# Patient Record
Sex: FEMALE | Race: WHITE | HISPANIC OR LATINO | Employment: FULL TIME | URBAN - METROPOLITAN AREA
[De-identification: names, ages, dates, MRNs, and addresses within clinical notes are randomized per-mention and may not be internally consistent; named-entity substitution may affect disease eponyms.]

---

## 2020-07-08 ENCOUNTER — APPOINTMENT (EMERGENCY)
Dept: CT IMAGING | Facility: HOSPITAL | Age: 35
End: 2020-07-08
Payer: COMMERCIAL

## 2020-07-08 ENCOUNTER — HOSPITAL ENCOUNTER (EMERGENCY)
Facility: HOSPITAL | Age: 35
Discharge: HOME | End: 2020-07-08
Attending: EMERGENCY MEDICINE | Admitting: EMERGENCY MEDICINE
Payer: COMMERCIAL

## 2020-07-08 VITALS
DIASTOLIC BLOOD PRESSURE: 69 MMHG | SYSTOLIC BLOOD PRESSURE: 118 MMHG | OXYGEN SATURATION: 100 % | TEMPERATURE: 97.9 F | HEART RATE: 70 BPM | WEIGHT: 200.4 LBS | RESPIRATION RATE: 18 BRPM

## 2020-07-08 DIAGNOSIS — G43.909 MIGRAINE WITHOUT STATUS MIGRAINOSUS, NOT INTRACTABLE, UNSPECIFIED MIGRAINE TYPE: Primary | ICD-10-CM

## 2020-07-08 LAB
EXT PREG TEST URINE: NEGATIVE
EXT. CONTROL ED NAV: NORMAL

## 2020-07-08 PROCEDURE — 99284 EMERGENCY DEPT VISIT MOD MDM: CPT

## 2020-07-08 PROCEDURE — 96374 THER/PROPH/DIAG INJ IV PUSH: CPT

## 2020-07-08 PROCEDURE — 96361 HYDRATE IV INFUSION ADD-ON: CPT

## 2020-07-08 PROCEDURE — 96375 TX/PRO/DX INJ NEW DRUG ADDON: CPT

## 2020-07-08 PROCEDURE — 99284 EMERGENCY DEPT VISIT MOD MDM: CPT | Performed by: PHYSICIAN ASSISTANT

## 2020-07-08 PROCEDURE — 70450 CT HEAD/BRAIN W/O DYE: CPT

## 2020-07-08 PROCEDURE — 81025 URINE PREGNANCY TEST: CPT | Performed by: PHYSICIAN ASSISTANT

## 2020-07-08 RX ORDER — KETOROLAC TROMETHAMINE 30 MG/ML
30 INJECTION, SOLUTION INTRAMUSCULAR; INTRAVENOUS ONCE
Status: COMPLETED | OUTPATIENT
Start: 2020-07-08 | End: 2020-07-08

## 2020-07-08 RX ORDER — IBUPROFEN 600 MG/1
600 TABLET ORAL EVERY 6 HOURS PRN
Qty: 30 TABLET | Refills: 0 | Status: SHIPPED | OUTPATIENT
Start: 2020-07-08 | End: 2020-12-02

## 2020-07-08 RX ORDER — DIPHENHYDRAMINE HYDROCHLORIDE 50 MG/ML
25 INJECTION INTRAMUSCULAR; INTRAVENOUS ONCE
Status: COMPLETED | OUTPATIENT
Start: 2020-07-08 | End: 2020-07-08

## 2020-07-08 RX ORDER — SODIUM CHLORIDE 9 MG/ML
250 INJECTION, SOLUTION INTRAVENOUS CONTINUOUS
Status: DISCONTINUED | OUTPATIENT
Start: 2020-07-08 | End: 2020-07-08 | Stop reason: HOSPADM

## 2020-07-08 RX ORDER — ONDANSETRON 4 MG/1
4 TABLET, FILM COATED ORAL EVERY 12 HOURS PRN
Qty: 12 TABLET | Refills: 0 | Status: SHIPPED | OUTPATIENT
Start: 2020-07-08 | End: 2020-12-09

## 2020-07-08 RX ORDER — METOCLOPRAMIDE HYDROCHLORIDE 5 MG/ML
10 INJECTION INTRAMUSCULAR; INTRAVENOUS ONCE
Status: COMPLETED | OUTPATIENT
Start: 2020-07-08 | End: 2020-07-08

## 2020-07-08 RX ADMIN — METOCLOPRAMIDE 10 MG: 5 INJECTION, SOLUTION INTRAMUSCULAR; INTRAVENOUS at 10:39

## 2020-07-08 RX ADMIN — KETOROLAC TROMETHAMINE 30 MG: 30 INJECTION, SOLUTION INTRAMUSCULAR; INTRAVENOUS at 12:48

## 2020-07-08 RX ADMIN — SODIUM CHLORIDE 250 ML/HR: 0.9 INJECTION, SOLUTION INTRAVENOUS at 10:43

## 2020-07-08 RX ADMIN — DIPHENHYDRAMINE HYDROCHLORIDE 25 MG: 50 INJECTION INTRAMUSCULAR; INTRAVENOUS at 10:39

## 2020-07-08 NOTE — ED PROVIDER NOTES
History  Chief Complaint   Patient presents with    Migraine     pt c/o a migraine x2 weeks with no relief from prescribed medications  Topomax taken last night       Medical Problem   Location:  Pt with left sided h eadache x 2 weeks  like past headaches  pt with nausea photophobia like past headaches   Severity:  Moderate  Onset quality:  Gradual  Duration:  2 weeks  Timing:  Constant  Progression:  Unchanged  Chronicity:  Recurrent  Associated symptoms: headaches and nausea    Associated symptoms: no abdominal pain, no chest pain, no congestion, no cough, no diarrhea, no ear pain, no fatigue, no fever, no loss of consciousness, no myalgias, no rash, no rhinorrhea, no shortness of breath, no sore throat, no vomiting and no wheezing        None       Past Medical History:   Diagnosis Date    Asthma     Hypertension     Migraine        Past Surgical History:   Procedure Laterality Date    TUBAL LIGATION         History reviewed  No pertinent family history  I have reviewed and agree with the history as documented  E-Cigarette/Vaping     E-Cigarette/Vaping Substances     Social History     Tobacco Use    Smoking status: Current Every Day Smoker     Packs/day: 0 50     Types: Cigarettes    Smokeless tobacco: Never Used   Substance Use Topics    Alcohol use: Not Currently    Drug use: Never       Review of Systems   Constitutional: Negative  Negative for fatigue and fever  HENT: Negative  Negative for congestion, ear pain, rhinorrhea and sore throat  Eyes: Negative  Respiratory: Negative  Negative for cough, shortness of breath and wheezing  Cardiovascular: Negative  Negative for chest pain  Gastrointestinal: Positive for nausea  Negative for abdominal pain, diarrhea and vomiting  Endocrine: Negative  Genitourinary: Negative  Musculoskeletal: Negative  Negative for myalgias  Skin: Negative for rash  Allergic/Immunologic: Negative  Neurological: Positive for headaches  Negative for loss of consciousness  Hematological: Negative  Psychiatric/Behavioral: Negative  All other systems reviewed and are negative  Physical Exam  Physical Exam   Constitutional: She is oriented to person, place, and time  She appears well-developed and well-nourished  1245 pt with much improved pain  Declines spinal tap will send pt home    HENT:   Head: Normocephalic and atraumatic  Right Ear: External ear normal    Left Ear: External ear normal    Nose: Nose normal    Mouth/Throat: Oropharynx is clear and moist    Eyes: Pupils are equal, round, and reactive to light  Conjunctivae and EOM are normal    Neck: Normal range of motion  Neck supple  Cardiovascular: Normal rate, regular rhythm, normal heart sounds and intact distal pulses  Pulmonary/Chest: Effort normal and breath sounds normal    Abdominal: Soft  Bowel sounds are normal    Musculoskeletal: Normal range of motion  Neurological: She is alert and oriented to person, place, and time  Skin: Skin is warm  Capillary refill takes less than 2 seconds  Psychiatric: She has a normal mood and affect  Her behavior is normal    Nursing note and vitals reviewed        Vital Signs  ED Triage Vitals   Temperature Pulse Respirations Blood Pressure SpO2   07/08/20 1020 07/08/20 1020 07/08/20 1020 07/08/20 1020 07/08/20 1020   97 9 °F (36 6 °C) (!) 106 18 129/81 99 %      Temp Source Heart Rate Source Patient Position - Orthostatic VS BP Location FiO2 (%)   07/08/20 1020 07/08/20 1020 07/08/20 1020 07/08/20 1020 --   Tympanic Monitor Sitting Left arm       Pain Score       07/08/20 1045       Worst Possible Pain           Vitals:    07/08/20 1020 07/08/20 1249   BP: 129/81 118/69   Pulse: (!) 106 70   Patient Position - Orthostatic VS: Sitting Sitting         Visual Acuity      ED Medications  Medications   metoclopramide (REGLAN) injection 10 mg (10 mg Intravenous Given 7/8/20 1039)   diphenhydrAMINE (BENADRYL) injection 25 mg (25 mg Intravenous Given 7/8/20 1039)   ketorolac (TORADOL) injection 30 mg (30 mg Intravenous Given 7/8/20 1248)       Diagnostic Studies  Results Reviewed     Procedure Component Value Units Date/Time    POCT pregnancy, urine [[id number]]  (Normal) Resulted:  07/08/20 1039    Lab Status:  Final result Updated:  07/08/20 1039     EXT PREG TEST UR (Ref: Negative) negative     Control valid                 CT head without contrast   Final Result by Becky Amanda MD (07/08 1228)      No acute intracranial abnormality  Workstation performed: [id number]                    Procedures  Procedures         ED Course       US AUDIT      Most Recent Value   Initial Alcohol Screen: US AUDIT-C    1  How often do you have a drink containing alcohol?  0 Filed at: 07/08/2020 1023   2  How many drinks containing alcohol do you have on a typical day you are drinking? 0 Filed at: 07/08/2020 1023   3a  Male UNDER 65: How often do you have five or more drinks on one occasion? 0 Filed at: 07/08/2020 1023   3b  FEMALE Any Age, or MALE 65+: How often do you have 4 or more drinks on one occassion? 0 Filed at: 07/08/2020 1023   Audit-C Score  0 Filed at: 07/08/2020 1023                  NALDO/DAST-10      Most Recent Value   How many times in the past year have you    Used an illegal drug or used a prescription medication for non-medical reasons? Never Filed at: 07/08/2020 1022                                MDM      Disposition  Final diagnoses:   Migraine without status migrainosus, not intractable, unspecified migraine type     Time reflects when diagnosis was documented in both MDM as applicable and the Disposition within this note     Time User Action Codes Description Comment    7/8/2020 12:45 PM Alex Hall   Add [G43 909] Migraine without status migrainosus, not intractable, unspecified migraine type       ED Disposition     ED Disposition Condition Date/Time Comment    Discharge Stable Wed Jul 8, 2020 12:45 PM Vern Lind discharge to home/self care  Follow-up Information     Follow up With Specialties Details Why [address] [place] [address]  [phone]            Discharge Medication List as of 7/8/2020 12:47 PM      START taking these medications    Details   ibuprofen (MOTRIN) 600 mg tablet Take 1 tablet (600 mg total) by mouth every 6 (six) hours as needed for mild pain, Starting Wed 7/8/2020, Print      ondansetron (ZOFRAN) 4 mg tablet Take 1 tablet (4 mg total) by mouth every 12 (twelve) hours as needed for nausea, Starting Wed 7/8/2020, Print           No discharge procedures on file      PDMP Review     None          ED Provider  Electronically Signed by           Hansa Zayas PA-C  07/08/20 7632

## 2020-12-02 ENCOUNTER — HOSPITAL ENCOUNTER (EMERGENCY)
Facility: HOSPITAL | Age: 35
Discharge: HOME | End: 2020-12-03
Attending: EMERGENCY MEDICINE | Admitting: EMERGENCY MEDICINE
Payer: COMMERCIAL

## 2020-12-02 ENCOUNTER — APPOINTMENT (EMERGENCY)
Dept: RADIOLOGY | Facility: HOSPITAL | Age: 35
End: 2020-12-02
Payer: COMMERCIAL

## 2020-12-02 DIAGNOSIS — S52.502A DISTAL RADIUS FRACTURE, LEFT: Primary | ICD-10-CM

## 2020-12-02 PROCEDURE — 73110 X-RAY EXAM OF WRIST: CPT

## 2020-12-02 PROCEDURE — 29125 APPL SHORT ARM SPLINT STATIC: CPT | Performed by: PHYSICIAN ASSISTANT

## 2020-12-02 PROCEDURE — 96374 THER/PROPH/DIAG INJ IV PUSH: CPT

## 2020-12-02 PROCEDURE — 99285 EMERGENCY DEPT VISIT HI MDM: CPT | Performed by: PHYSICIAN ASSISTANT

## 2020-12-02 PROCEDURE — 73090 X-RAY EXAM OF FOREARM: CPT

## 2020-12-02 PROCEDURE — 99284 EMERGENCY DEPT VISIT MOD MDM: CPT

## 2020-12-02 RX ORDER — ESCITALOPRAM OXALATE 10 MG/1
10 TABLET ORAL AS NEEDED
COMMUNITY
Start: 2020-07-28

## 2020-12-02 RX ORDER — FENTANYL CITRATE 50 UG/ML
75 INJECTION, SOLUTION INTRAMUSCULAR; INTRAVENOUS ONCE
Status: COMPLETED | OUTPATIENT
Start: 2020-12-02 | End: 2020-12-02

## 2020-12-02 RX ORDER — TOPIRAMATE 50 MG/1
50 TABLET, FILM COATED ORAL AS NEEDED
COMMUNITY
Start: 2020-06-16 | End: 2021-06-16

## 2020-12-02 RX ORDER — ALBUTEROL SULFATE 90 UG/1
2 AEROSOL, METERED RESPIRATORY (INHALATION) EVERY 6 HOURS PRN
COMMUNITY
Start: 2020-04-01 | End: 2021-04-01

## 2020-12-02 RX ORDER — SUMATRIPTAN 50 MG/1
50 TABLET, FILM COATED ORAL 2 TIMES DAILY PRN
COMMUNITY
Start: 2020-07-28 | End: 2021-07-28

## 2020-12-02 RX ORDER — OXYCODONE HYDROCHLORIDE AND ACETAMINOPHEN 5; 325 MG/1; MG/1
1 TABLET ORAL ONCE
Status: DISCONTINUED | OUTPATIENT
Start: 2020-12-02 | End: 2020-12-02

## 2020-12-02 RX ORDER — IBUPROFEN 400 MG/1
400 TABLET ORAL ONCE
Status: COMPLETED | OUTPATIENT
Start: 2020-12-02 | End: 2020-12-02

## 2020-12-02 RX ADMIN — FENTANYL CITRATE 75 MCG: 50 INJECTION, SOLUTION INTRAMUSCULAR; INTRAVENOUS at 23:45

## 2020-12-02 RX ADMIN — IBUPROFEN 400 MG: 400 TABLET ORAL at 21:27

## 2020-12-03 ENCOUNTER — TELEPHONE (OUTPATIENT)
Dept: OBGYN CLINIC | Facility: HOSPITAL | Age: 35
End: 2020-12-03

## 2020-12-03 ENCOUNTER — APPOINTMENT (EMERGENCY)
Dept: RADIOLOGY | Facility: HOSPITAL | Age: 35
End: 2020-12-03
Payer: COMMERCIAL

## 2020-12-03 VITALS
HEIGHT: 68 IN | TEMPERATURE: 98.1 F | HEART RATE: 80 BPM | DIASTOLIC BLOOD PRESSURE: 70 MMHG | BODY MASS INDEX: 28.79 KG/M2 | OXYGEN SATURATION: 99 % | WEIGHT: 190 LBS | RESPIRATION RATE: 16 BRPM | SYSTOLIC BLOOD PRESSURE: 121 MMHG

## 2020-12-03 PROCEDURE — 73110 X-RAY EXAM OF WRIST: CPT

## 2020-12-03 PROCEDURE — 96376 TX/PRO/DX INJ SAME DRUG ADON: CPT

## 2020-12-03 RX ORDER — FENTANYL CITRATE 50 UG/ML
INJECTION, SOLUTION INTRAMUSCULAR; INTRAVENOUS
Status: DISPENSED
Start: 2020-12-03 | End: 2020-12-03

## 2020-12-03 RX ORDER — FENTANYL CITRATE 50 UG/ML
50 INJECTION, SOLUTION INTRAMUSCULAR; INTRAVENOUS ONCE
Status: COMPLETED | OUTPATIENT
Start: 2020-12-03 | End: 2020-12-03

## 2020-12-03 RX ADMIN — FENTANYL CITRATE 50 MCG: 50 INJECTION, SOLUTION INTRAMUSCULAR; INTRAVENOUS at 01:15

## 2020-12-08 ENCOUNTER — OFFICE VISIT (OUTPATIENT)
Dept: OBGYN CLINIC | Facility: MEDICAL CENTER | Age: 35
End: 2020-12-08
Payer: COMMERCIAL

## 2020-12-08 VITALS
TEMPERATURE: 96.7 F | HEART RATE: 108 BPM | WEIGHT: 190 LBS | BODY MASS INDEX: 28.79 KG/M2 | HEIGHT: 68 IN | SYSTOLIC BLOOD PRESSURE: 126 MMHG | DIASTOLIC BLOOD PRESSURE: 79 MMHG

## 2020-12-08 DIAGNOSIS — S52.602A CLOSED FRACTURE DISTAL RADIUS AND ULNA, LEFT, INITIAL ENCOUNTER: Primary | ICD-10-CM

## 2020-12-08 DIAGNOSIS — S52.502A CLOSED FRACTURE DISTAL RADIUS AND ULNA, LEFT, INITIAL ENCOUNTER: Primary | ICD-10-CM

## 2020-12-08 PROCEDURE — 29125 APPL SHORT ARM SPLINT STATIC: CPT | Performed by: ORTHOPAEDIC SURGERY

## 2020-12-08 PROCEDURE — 99204 OFFICE O/P NEW MOD 45 MIN: CPT | Performed by: ORTHOPAEDIC SURGERY

## 2020-12-08 RX ORDER — CEFAZOLIN SODIUM 2 G/50ML
2000 SOLUTION INTRAVENOUS ONCE
Status: CANCELLED | OUTPATIENT
Start: 2020-12-11 | End: 2020-12-08

## 2020-12-10 ENCOUNTER — TELEPHONE (OUTPATIENT)
Dept: OBGYN CLINIC | Facility: MEDICAL CENTER | Age: 35
End: 2020-12-10

## 2020-12-10 DIAGNOSIS — S52.502A CLOSED FRACTURE DISTAL RADIUS AND ULNA, LEFT, INITIAL ENCOUNTER: Primary | ICD-10-CM

## 2020-12-10 DIAGNOSIS — S52.602A CLOSED FRACTURE DISTAL RADIUS AND ULNA, LEFT, INITIAL ENCOUNTER: Primary | ICD-10-CM

## 2020-12-11 RX ORDER — IBUPROFEN 600 MG/1
600 TABLET ORAL EVERY 6 HOURS PRN
Qty: 30 TABLET | Refills: 0 | Status: SHIPPED | OUTPATIENT
Start: 2020-12-11 | End: 2020-12-15 | Stop reason: HOSPADM

## 2020-12-11 RX ORDER — OXYCODONE HYDROCHLORIDE 5 MG/1
5 TABLET ORAL EVERY 6 HOURS PRN
Qty: 10 TABLET | Refills: 0 | Status: ON HOLD | OUTPATIENT
Start: 2020-12-11 | End: 2020-12-15 | Stop reason: SDUPTHER

## 2020-12-14 ENCOUNTER — ANESTHESIA EVENT (OUTPATIENT)
Dept: PERIOP | Facility: HOSPITAL | Age: 35
End: 2020-12-14
Payer: COMMERCIAL

## 2020-12-15 ENCOUNTER — HOSPITAL ENCOUNTER (OUTPATIENT)
Facility: HOSPITAL | Age: 35
Setting detail: OUTPATIENT SURGERY
Discharge: HOME | End: 2020-12-15
Attending: ORTHOPAEDIC SURGERY | Admitting: ORTHOPAEDIC SURGERY
Payer: COMMERCIAL

## 2020-12-15 ENCOUNTER — HOSPITAL ENCOUNTER (OUTPATIENT)
Dept: RADIOLOGY | Facility: HOSPITAL | Age: 35
Setting detail: OUTPATIENT SURGERY
Discharge: HOME | End: 2020-12-15
Payer: COMMERCIAL

## 2020-12-15 ENCOUNTER — ANESTHESIA (OUTPATIENT)
Dept: PERIOP | Facility: HOSPITAL | Age: 35
End: 2020-12-15
Payer: COMMERCIAL

## 2020-12-15 VITALS
RESPIRATION RATE: 16 BRPM | DIASTOLIC BLOOD PRESSURE: 75 MMHG | TEMPERATURE: 96.7 F | SYSTOLIC BLOOD PRESSURE: 121 MMHG | HEIGHT: 68 IN | HEART RATE: 97 BPM | WEIGHT: 190 LBS | OXYGEN SATURATION: 98 % | BODY MASS INDEX: 28.79 KG/M2

## 2020-12-15 VITALS — HEART RATE: 94 BPM

## 2020-12-15 DIAGNOSIS — S52.602A CLOSED FRACTURE DISTAL RADIUS AND ULNA, LEFT, INITIAL ENCOUNTER: ICD-10-CM

## 2020-12-15 DIAGNOSIS — S52.502A CLOSED FRACTURE DISTAL RADIUS AND ULNA, LEFT, INITIAL ENCOUNTER: ICD-10-CM

## 2020-12-15 PROBLEM — R51.9 HEADACHE: Status: ACTIVE | Noted: 2020-12-15

## 2020-12-15 PROBLEM — F41.9 ANXIETY: Status: ACTIVE | Noted: 2020-12-15

## 2020-12-15 PROBLEM — J45.909 ASTHMA: Status: ACTIVE | Noted: 2020-12-15

## 2020-12-15 LAB
EXT PREGNANCY TEST URINE: NEGATIVE
EXT. CONTROL: NORMAL

## 2020-12-15 PROCEDURE — C1713 ANCHOR/SCREW BN/BN,TIS/BN: HCPCS | Performed by: ORTHOPAEDIC SURGERY

## 2020-12-15 PROCEDURE — 81025 URINE PREGNANCY TEST: CPT | Performed by: STUDENT IN AN ORGANIZED HEALTH CARE EDUCATION/TRAINING PROGRAM

## 2020-12-15 PROCEDURE — 73100 X-RAY EXAM OF WRIST: CPT

## 2020-12-15 PROCEDURE — 25609 OPTX DST RD XART FX/EP SEP3+: CPT | Performed by: ORTHOPAEDIC SURGERY

## 2020-12-15 DEVICE — SCREW CORTICAL 3.2 X 12MM
Type: IMPLANTABLE DEVICE | Site: WRIST | Status: NON-FUNCTIONAL
Removed: 2021-03-23

## 2020-12-15 DEVICE — K-WIRE FIXATION 1.1 X 100MM SS: Type: IMPLANTABLE DEVICE | Site: WRIST | Status: FUNCTIONAL

## 2020-12-15 DEVICE — SCREW CORT 2.7 X 10MM
Type: IMPLANTABLE DEVICE | Site: WRIST | Status: NON-FUNCTIONAL
Removed: 2021-03-23

## 2020-12-15 DEVICE — SCREW CORT LCK 2.7 X 14MM
Type: IMPLANTABLE DEVICE | Site: WRIST | Status: NON-FUNCTIONAL
Removed: 2021-03-23

## 2020-12-15 DEVICE — SCREW CORT LCK 2.7 X 10MM
Type: IMPLANTABLE DEVICE | Site: WRIST | Status: NON-FUNCTIONAL
Removed: 2021-03-23

## 2020-12-15 DEVICE — SCREW CORTICAL 3.2 X 14MM
Type: IMPLANTABLE DEVICE | Site: WRIST | Status: NON-FUNCTIONAL
Removed: 2021-03-23

## 2020-12-15 DEVICE — PLATE BRIDGE
Type: IMPLANTABLE DEVICE | Site: WRIST | Status: NON-FUNCTIONAL
Removed: 2021-03-23

## 2020-12-15 RX ORDER — CEFAZOLIN SODIUM 2 G/50ML
SOLUTION INTRAVENOUS AS NEEDED
Status: DISCONTINUED | OUTPATIENT
Start: 2020-12-15 | End: 2020-12-15

## 2020-12-15 RX ORDER — HYDROMORPHONE HCL/PF 1 MG/ML
0.2 SYRINGE (ML) INJECTION
Status: DISCONTINUED | OUTPATIENT
Start: 2020-12-15 | End: 2020-12-15 | Stop reason: HOSPADM

## 2020-12-15 RX ORDER — METOCLOPRAMIDE HYDROCHLORIDE 5 MG/ML
10 INJECTION INTRAMUSCULAR; INTRAVENOUS ONCE AS NEEDED
Status: DISCONTINUED | OUTPATIENT
Start: 2020-12-15 | End: 2020-12-15 | Stop reason: HOSPADM

## 2020-12-15 RX ORDER — MIDAZOLAM HYDROCHLORIDE 2 MG/2ML
INJECTION, SOLUTION INTRAMUSCULAR; INTRAVENOUS AS NEEDED
Status: DISCONTINUED | OUTPATIENT
Start: 2020-12-15 | End: 2020-12-15

## 2020-12-15 RX ORDER — DIPHENHYDRAMINE HYDROCHLORIDE 50 MG/ML
12.5 INJECTION INTRAMUSCULAR; INTRAVENOUS ONCE AS NEEDED
Status: DISCONTINUED | OUTPATIENT
Start: 2020-12-15 | End: 2020-12-15 | Stop reason: HOSPADM

## 2020-12-15 RX ORDER — FENTANYL CITRATE/PF 50 MCG/ML
50 SYRINGE (ML) INJECTION
Status: DISCONTINUED | OUTPATIENT
Start: 2020-12-15 | End: 2020-12-15 | Stop reason: HOSPADM

## 2020-12-15 RX ORDER — HYDROMORPHONE HCL/PF 1 MG/ML
0.5 SYRINGE (ML) INJECTION
Status: DISCONTINUED | OUTPATIENT
Start: 2020-12-15 | End: 2020-12-15 | Stop reason: HOSPADM

## 2020-12-15 RX ORDER — OXYCODONE HYDROCHLORIDE 5 MG/1
5 TABLET ORAL EVERY 6 HOURS PRN
Qty: 20 TABLET | Refills: 0 | Status: SHIPPED | OUTPATIENT
Start: 2020-12-15 | End: 2020-12-25

## 2020-12-15 RX ORDER — EPHEDRINE SULFATE 50 MG/ML
INJECTION INTRAVENOUS AS NEEDED
Status: DISCONTINUED | OUTPATIENT
Start: 2020-12-15 | End: 2020-12-15

## 2020-12-15 RX ORDER — LIDOCAINE HYDROCHLORIDE 10 MG/ML
0.5 INJECTION, SOLUTION EPIDURAL; INFILTRATION; INTRACAUDAL; PERINEURAL ONCE AS NEEDED
Status: DISCONTINUED | OUTPATIENT
Start: 2020-12-15 | End: 2020-12-15 | Stop reason: SDUPTHER

## 2020-12-15 RX ORDER — NAPROXEN SODIUM 220 MG
220 TABLET ORAL 2 TIMES DAILY WITH MEALS
Qty: 10 TABLET | Refills: 0 | Status: SHIPPED | OUTPATIENT
Start: 2020-12-15 | End: 2021-03-18

## 2020-12-15 RX ORDER — SODIUM CHLORIDE, SODIUM LACTATE, POTASSIUM CHLORIDE, CALCIUM CHLORIDE 600; 310; 30; 20 MG/100ML; MG/100ML; MG/100ML; MG/100ML
125 INJECTION, SOLUTION INTRAVENOUS CONTINUOUS
Status: DISCONTINUED | OUTPATIENT
Start: 2020-12-15 | End: 2020-12-15 | Stop reason: HOSPADM

## 2020-12-15 RX ORDER — SENNOSIDES 8.6 MG
650 CAPSULE ORAL EVERY 8 HOURS
Qty: 15 TABLET | Refills: 0 | Status: SHIPPED | OUTPATIENT
Start: 2020-12-15 | End: 2020-12-20

## 2020-12-15 RX ORDER — METOCLOPRAMIDE HYDROCHLORIDE 5 MG/ML
INJECTION INTRAMUSCULAR; INTRAVENOUS AS NEEDED
Status: DISCONTINUED | OUTPATIENT
Start: 2020-12-15 | End: 2020-12-15

## 2020-12-15 RX ORDER — DEXAMETHASONE SODIUM PHOSPHATE 10 MG/ML
INJECTION, SOLUTION INTRAMUSCULAR; INTRAVENOUS AS NEEDED
Status: DISCONTINUED | OUTPATIENT
Start: 2020-12-15 | End: 2020-12-15

## 2020-12-15 RX ORDER — CEFAZOLIN SODIUM 2 G/50ML
2000 SOLUTION INTRAVENOUS ONCE
Status: DISCONTINUED | OUTPATIENT
Start: 2020-12-15 | End: 2020-12-15 | Stop reason: HOSPADM

## 2020-12-15 RX ORDER — PROPOFOL 10 MG/ML
INJECTION, EMULSION INTRAVENOUS AS NEEDED
Status: DISCONTINUED | OUTPATIENT
Start: 2020-12-15 | End: 2020-12-15

## 2020-12-15 RX ORDER — ROPIVACAINE HYDROCHLORIDE 5 MG/ML
INJECTION, SOLUTION EPIDURAL; INFILTRATION; PERINEURAL
Status: COMPLETED | OUTPATIENT
Start: 2020-12-15 | End: 2020-12-15

## 2020-12-15 RX ORDER — ONDANSETRON 2 MG/ML
4 INJECTION INTRAMUSCULAR; INTRAVENOUS ONCE AS NEEDED
Status: DISCONTINUED | OUTPATIENT
Start: 2020-12-15 | End: 2020-12-15 | Stop reason: HOSPADM

## 2020-12-15 RX ORDER — LIDOCAINE HYDROCHLORIDE 10 MG/ML
0.5 INJECTION, SOLUTION EPIDURAL; INFILTRATION; INTRACAUDAL; PERINEURAL ONCE AS NEEDED
Status: COMPLETED | OUTPATIENT
Start: 2020-12-15 | End: 2020-12-15

## 2020-12-15 RX ORDER — OXYCODONE HYDROCHLORIDE 5 MG/1
5 TABLET ORAL EVERY 6 HOURS PRN
Status: DISCONTINUED | OUTPATIENT
Start: 2020-12-15 | End: 2020-12-15 | Stop reason: HOSPADM

## 2020-12-15 RX ORDER — LIDOCAINE HYDROCHLORIDE 10 MG/ML
INJECTION, SOLUTION EPIDURAL; INFILTRATION; INTRACAUDAL; PERINEURAL AS NEEDED
Status: DISCONTINUED | OUTPATIENT
Start: 2020-12-15 | End: 2020-12-15

## 2020-12-15 RX ORDER — SODIUM CHLORIDE, SODIUM LACTATE, POTASSIUM CHLORIDE, CALCIUM CHLORIDE 600; 310; 30; 20 MG/100ML; MG/100ML; MG/100ML; MG/100ML
50 INJECTION, SOLUTION INTRAVENOUS CONTINUOUS
Status: DISCONTINUED | OUTPATIENT
Start: 2020-12-15 | End: 2020-12-15 | Stop reason: HOSPADM

## 2020-12-15 RX ADMIN — DEXAMETHASONE SODIUM PHOSPHATE 10 MG: 10 INJECTION, SOLUTION INTRAMUSCULAR; INTRAVENOUS at 11:17

## 2020-12-15 RX ADMIN — SODIUM CHLORIDE, SODIUM LACTATE, POTASSIUM CHLORIDE, AND CALCIUM CHLORIDE 125 ML/HR: .6; .31; .03; .02 INJECTION, SOLUTION INTRAVENOUS at 10:11

## 2020-12-15 RX ADMIN — CEFAZOLIN SODIUM 2000 MG: 2 SOLUTION INTRAVENOUS at 11:05

## 2020-12-15 RX ADMIN — EPHEDRINE SULFATE 10 MG: 50 INJECTION, SOLUTION INTRAVENOUS at 11:41

## 2020-12-15 RX ADMIN — ROPIVACAINE HYDROCHLORIDE 30 ML: 5 INJECTION, SOLUTION EPIDURAL; INFILTRATION; PERINEURAL at 10:44

## 2020-12-15 RX ADMIN — MIDAZOLAM 2 MG: 1 INJECTION INTRAMUSCULAR; INTRAVENOUS at 10:42

## 2020-12-15 RX ADMIN — LIDOCAINE HYDROCHLORIDE 50 MG: 10 INJECTION, SOLUTION EPIDURAL; INFILTRATION; INTRACAUDAL; PERINEURAL at 11:10

## 2020-12-15 RX ADMIN — METOCLOPRAMIDE 10 MG: 5 INJECTION, SOLUTION INTRAMUSCULAR; INTRAVENOUS at 11:18

## 2020-12-15 RX ADMIN — EPHEDRINE SULFATE 10 MG: 50 INJECTION, SOLUTION INTRAVENOUS at 11:36

## 2020-12-15 RX ADMIN — LIDOCAINE HYDROCHLORIDE 0.2 ML: 10 INJECTION, SOLUTION EPIDURAL; INFILTRATION; INTRACAUDAL; PERINEURAL at 10:11

## 2020-12-15 RX ADMIN — PHENYLEPHRINE HYDROCHLORIDE 200 MCG: 10 INJECTION INTRAVENOUS at 11:21

## 2020-12-15 RX ADMIN — PHENYLEPHRINE HYDROCHLORIDE 400 MCG: 10 INJECTION INTRAVENOUS at 11:10

## 2020-12-15 RX ADMIN — SODIUM CHLORIDE, SODIUM LACTATE, POTASSIUM CHLORIDE, AND CALCIUM CHLORIDE: .6; .31; .03; .02 INJECTION, SOLUTION INTRAVENOUS at 11:06

## 2020-12-15 RX ADMIN — PROPOFOL 200 MG: 10 INJECTION, EMULSION INTRAVENOUS at 11:10

## 2020-12-15 RX ADMIN — EPHEDRINE SULFATE 5 MG: 50 INJECTION, SOLUTION INTRAVENOUS at 12:25

## 2020-12-23 ENCOUNTER — APPOINTMENT (OUTPATIENT)
Dept: RADIOLOGY | Facility: MEDICAL CENTER | Age: 35
End: 2020-12-23
Payer: COMMERCIAL

## 2020-12-23 ENCOUNTER — OFFICE VISIT (OUTPATIENT)
Dept: OBGYN CLINIC | Facility: MEDICAL CENTER | Age: 35
End: 2020-12-23

## 2020-12-23 VITALS
DIASTOLIC BLOOD PRESSURE: 104 MMHG | HEIGHT: 68 IN | BODY MASS INDEX: 28.79 KG/M2 | WEIGHT: 190 LBS | SYSTOLIC BLOOD PRESSURE: 145 MMHG | TEMPERATURE: 96.4 F | HEART RATE: 81 BPM

## 2020-12-23 DIAGNOSIS — Z98.890 S/P ORIF (OPEN REDUCTION INTERNAL FIXATION) FRACTURE: ICD-10-CM

## 2020-12-23 DIAGNOSIS — S52.602A CLOSED FRACTURE DISTAL RADIUS AND ULNA, LEFT, INITIAL ENCOUNTER: Primary | ICD-10-CM

## 2020-12-23 DIAGNOSIS — S52.502A CLOSED FRACTURE DISTAL RADIUS AND ULNA, LEFT, INITIAL ENCOUNTER: ICD-10-CM

## 2020-12-23 DIAGNOSIS — Z87.81 S/P ORIF (OPEN REDUCTION INTERNAL FIXATION) FRACTURE: ICD-10-CM

## 2020-12-23 DIAGNOSIS — S52.602A CLOSED FRACTURE DISTAL RADIUS AND ULNA, LEFT, INITIAL ENCOUNTER: ICD-10-CM

## 2020-12-23 DIAGNOSIS — S52.502A CLOSED FRACTURE DISTAL RADIUS AND ULNA, LEFT, INITIAL ENCOUNTER: Primary | ICD-10-CM

## 2020-12-23 PROCEDURE — 99024 POSTOP FOLLOW-UP VISIT: CPT | Performed by: ORTHOPAEDIC SURGERY

## 2020-12-23 PROCEDURE — 73110 X-RAY EXAM OF WRIST: CPT

## 2021-01-11 ENCOUNTER — OFFICE VISIT (OUTPATIENT)
Dept: OBGYN CLINIC | Facility: CLINIC | Age: 36
End: 2021-01-11

## 2021-01-11 ENCOUNTER — APPOINTMENT (OUTPATIENT)
Dept: RADIOLOGY | Facility: CLINIC | Age: 36
End: 2021-01-11
Payer: COMMERCIAL

## 2021-01-11 VITALS
BODY MASS INDEX: 30.86 KG/M2 | WEIGHT: 203.6 LBS | HEIGHT: 68 IN | SYSTOLIC BLOOD PRESSURE: 142 MMHG | DIASTOLIC BLOOD PRESSURE: 82 MMHG

## 2021-01-11 DIAGNOSIS — Z98.890 S/P ORIF (OPEN REDUCTION INTERNAL FIXATION) FRACTURE: Primary | ICD-10-CM

## 2021-01-11 DIAGNOSIS — Z98.890 S/P ORIF (OPEN REDUCTION INTERNAL FIXATION) FRACTURE: ICD-10-CM

## 2021-01-11 DIAGNOSIS — Z87.81 S/P ORIF (OPEN REDUCTION INTERNAL FIXATION) FRACTURE: Primary | ICD-10-CM

## 2021-01-11 DIAGNOSIS — Z87.81 S/P ORIF (OPEN REDUCTION INTERNAL FIXATION) FRACTURE: ICD-10-CM

## 2021-01-11 PROCEDURE — 99024 POSTOP FOLLOW-UP VISIT: CPT | Performed by: ORTHOPAEDIC SURGERY

## 2021-01-11 PROCEDURE — 73110 X-RAY EXAM OF WRIST: CPT

## 2021-01-11 NOTE — LETTER
January 11, 2021     Patient: Marietta Allen   YOB: 1985   Date of Visit: 1/11/2021       To Whom it May Concern:    Criss Hearn is under my professional care  She was seen in my office on 1/11/2021  She is not cleared to return to work at this time  If you have any questions or concerns, please don't hesitate to call  Sincerely,          Killian Isbell MD        CC: Aubrey Servin Query

## 2021-01-11 NOTE — PROGRESS NOTES
Assessment:   S/P Open reduction and internal fixation left-sided 3+ part distal radius [id number]-gqeuw-ndqlixqvi - Left and Application short-arm splint left arm - Left on 12/15/2020    Plan:   Pins removed in office today  A sterile bandage was applied in office today  Therapy and bracing    Follow Up:  6  week(s)    To Do Next Visit:  X-rays of the  left  wrist   Set up for operative intervention for removal of wrist spanning plate    CHIEF COMPLAINT:  Chief Complaint   Patient presents with    Left Wrist - Post-op         SUBJECTIVE:  Radhika Nelson is a 38 y o  female who presents for follow up after Open reduction and internal fixation left-sided 3+ part distal radius fxxipktg-wktmb-poobpewsg - Left and Application short-arm splint left arm - Left on 12/15/2020  Today patient has Pain  Mild  Constant  Dull, Numbness and Stiffness/LROM  Patient states she is having on achiness around the surgical site  She also has some numbness in the small finger, however, she states that is slightly better from the original presentation  Patient has      PHYSICAL EXAMINATION:  Vital signs: /82   Ht 5' 8" (1 727 m)   Wt 92 4 kg (203 lb 9 6 oz)   BMI 30 96 kg/m²   General: well developed and well nourished, alert, oriented times 3 and appears comfortable  Psychiatric: Normal    MUSCULOSKELETAL EXAMINATION:  Incision: Clean, dry, intact and There is the presence of a Vicryl suture coming out of the wound  Range of Motion: Limited due to stiffness - patient has supination to neutral, pronation to 45°    Patient has almost no movement of the thumb and minimal movement of the fingers diffusely  Neurovascular status: cap refill intact and Slightly diminished sensation along the ulnar distribution starting at the mid forearm to the small finger  Activity Restrictions: Cast/splint restrictions  Done today: Pin removed      STUDIES REVIEWED:  Images were reviewed in PACS by Dr MAKI MAR and demonstrate:  Well-aligned fracture with adequate healing noted on exam today      PROCEDURES PERFORMED:  Procedures - pins removed in office today    Renetta Dwyer,:  Nirav Veliz PA-C am acting as a scribe while in the presence of the attending physician :       I,:  El Easley MD personally performed the services described in this documentation    as scribed in my presence :

## 2021-02-19 ENCOUNTER — HOSPITAL ENCOUNTER (OUTPATIENT)
Dept: RADIOLOGY | Facility: HOSPITAL | Age: 36
Discharge: HOME | End: 2021-02-19
Attending: ORTHOPAEDIC SURGERY
Payer: COMMERCIAL

## 2021-02-19 ENCOUNTER — OFFICE VISIT (OUTPATIENT)
Dept: OBGYN CLINIC | Facility: HOSPITAL | Age: 36
End: 2021-02-19

## 2021-02-19 VITALS
HEART RATE: 85 BPM | WEIGHT: 203 LBS | HEIGHT: 68 IN | BODY MASS INDEX: 30.77 KG/M2 | DIASTOLIC BLOOD PRESSURE: 76 MMHG | SYSTOLIC BLOOD PRESSURE: 112 MMHG

## 2021-02-19 DIAGNOSIS — Z98.890 S/P ORIF (OPEN REDUCTION INTERNAL FIXATION) FRACTURE: Primary | ICD-10-CM

## 2021-02-19 DIAGNOSIS — Z98.890 S/P ORIF (OPEN REDUCTION INTERNAL FIXATION) FRACTURE: ICD-10-CM

## 2021-02-19 DIAGNOSIS — T84.84XA PAINFUL ORTHOPAEDIC HARDWARE (HCC): ICD-10-CM

## 2021-02-19 DIAGNOSIS — Z87.81 S/P ORIF (OPEN REDUCTION INTERNAL FIXATION) FRACTURE: ICD-10-CM

## 2021-02-19 DIAGNOSIS — Z87.81 S/P ORIF (OPEN REDUCTION INTERNAL FIXATION) FRACTURE: Primary | ICD-10-CM

## 2021-02-19 PROCEDURE — 99024 POSTOP FOLLOW-UP VISIT: CPT | Performed by: ORTHOPAEDIC SURGERY

## 2021-02-19 PROCEDURE — 73110 X-RAY EXAM OF WRIST: CPT

## 2021-02-19 RX ORDER — CLINDAMYCIN PHOSPHATE 900 MG/50ML
900 INJECTION INTRAVENOUS ONCE
Status: CANCELLED | OUTPATIENT
Start: 2021-02-19 | End: 2021-02-19

## 2021-02-19 NOTE — PROGRESS NOTES
Assessment:   S/P Open reduction and internal fixation left-sided 3+ part distal radius vmpehykq-mfpea-yvlssimlr - Left and Application short-arm splint left arm - Left on 12/15/2020    Plan:    patient was advised she may discontinue use of her wrist splint at this time  She was advised to continue with range of motion exercises at home  Patient will proceed with setting up a removal of spanning plate to be done 4 weeks from now  It was discussed with the patient that we want her to begin occupational therapy 2-3 days after surgery for range of motion  We will see her a few days preop in the office for an x-ray to evaluate healing to make sure it is appropriate to remove the spanning plate at that time  Follow Up:  4  week(s)    To Do Next Visit:  X-rays of the  left  wrist      CHIEF COMPLAINT:  Chief Complaint   Patient presents with    Left Wrist - Post-op         SUBJECTIVE:  Francisco Burnette is a 38 y o  female who presents for follow up after Open reduction and internal fixation left-sided 3+ part distal radius vvdtmudl-wwukg-tytfqkvdg - Left and Application short-arm splint left arm - Left on 12/15/2020  Today patient has Stiffness/LROM  PHYSICAL EXAMINATION:  Vital signs: /76   Pulse 85   Ht 5' 8" (1 727 m)   Wt 92 1 kg (203 lb)   BMI 30 87 kg/m²   General: well developed and well nourished, alert, oriented times 3 and appears comfortable  Psychiatric: Normal    MUSCULOSKELETAL EXAMINATION:  Incision: healed  Range of Motion: Limited due to stiffness   Near full composite fist formation, 80 supination, 50 pronation  Neurovascular status: Neuro intact, good cap refill  Activity Restrictions: Cast/splint restrictions         STUDIES REVIEWED:  Images were reviewed in PACS by Dr Adela Evans and demonstrate:  X-ray of left wrist on 02/19/2021 demonstrates 75% consolidation of distal radius fracture        PROCEDURES PERFORMED:  Procedures  No Procedures performed today   Scribe Attestation I,:  Juancarlos Ford am acting as a scribe while in the presence of the attending physician :       I,:  Deanne Puckett MD personally performed the services described in this documentation    as scribed in my presence :

## 2021-03-18 RX ORDER — ACETAMINOPHEN, ASPIRIN AND CAFFEINE 250; 250; 65 MG/1; MG/1; MG/1
1 TABLET, FILM COATED ORAL EVERY 6 HOURS PRN
COMMUNITY

## 2021-03-18 NOTE — PRE-PROCEDURE INSTRUCTIONS
Pre-Surgery Instructions:   Medication Instructions    albuterol (PROVENTIL HFA,VENTOLIN HFA) 90 mcg/act inhaler Continue to take these inhaler medications on your normal schedule up to and including the day of surgery    aspirin-acetaminophen-caffeine (EXCEDRIN MIGRAINE) 250-250-65 MG per tablet stop pre op    escitalopram (LEXAPRO) 10 mg tablet ok to take morning of DOS with sips of water    SUMAtriptan (IMITREX) 50 mg tablet PRN ok use morning of DOS if needed    topiramate (TOPAMAX) 50 MG tablet ok to take morning of DOS with sips of water   Have you had / have a sore throat? No  have you had / have a cough less than 1 week? No  Have you had / have a fever greater than 100 0 - 100  4? No  Are you experiencing any shortness of breath? No    Review with patient via phone medications and showering instructions  Advise smoking cessation education and declined  Advised ASC call with surgery schedule time, nothing to eat or drink after midnight  Verbalized understanding  Antidepressant Med Class  Continue to take this medication on your normal schedule  If this is an oral medication and you take it in the morning, then you may take this medicine with a sip of water  Antiepileptic Med Class  Continue to take this medication on your normal schedule  If this is an oral medication and you take it in the morning, then you may take this medicine with a sip of water    Inhalational Med Class  Continue to take these inhaler medications on your normal schedule up to and including the day of surgery

## 2021-03-19 ENCOUNTER — OFFICE VISIT (OUTPATIENT)
Dept: OBGYN CLINIC | Facility: HOSPITAL | Age: 36
End: 2021-03-19
Payer: COMMERCIAL

## 2021-03-19 ENCOUNTER — HOSPITAL ENCOUNTER (OUTPATIENT)
Dept: RADIOLOGY | Facility: HOSPITAL | Age: 36
Discharge: HOME | End: 2021-03-19
Attending: ORTHOPAEDIC SURGERY
Payer: COMMERCIAL

## 2021-03-19 VITALS
HEART RATE: 88 BPM | DIASTOLIC BLOOD PRESSURE: 79 MMHG | BODY MASS INDEX: 30.77 KG/M2 | HEIGHT: 68 IN | WEIGHT: 203 LBS | SYSTOLIC BLOOD PRESSURE: 112 MMHG

## 2021-03-19 DIAGNOSIS — Z98.890 S/P ORIF (OPEN REDUCTION INTERNAL FIXATION) FRACTURE: ICD-10-CM

## 2021-03-19 DIAGNOSIS — Z87.81 S/P ORIF (OPEN REDUCTION INTERNAL FIXATION) FRACTURE: ICD-10-CM

## 2021-03-19 DIAGNOSIS — Z87.81 S/P ORIF (OPEN REDUCTION INTERNAL FIXATION) FRACTURE: Primary | ICD-10-CM

## 2021-03-19 DIAGNOSIS — Z98.890 S/P ORIF (OPEN REDUCTION INTERNAL FIXATION) FRACTURE: Primary | ICD-10-CM

## 2021-03-19 PROCEDURE — 73110 X-RAY EXAM OF WRIST: CPT

## 2021-03-19 PROCEDURE — 99214 OFFICE O/P EST MOD 30 MIN: CPT | Performed by: ORTHOPAEDIC SURGERY

## 2021-03-19 NOTE — H&P (VIEW-ONLY)
ASSESSMENT/PLAN:    Assessment:   Left wrist status post open reduction internal fixation with dorsal wrist  spanningplate    Plan:    hardware removal left wrist followed by physical therapy under general anesthesia in the operating room  We discussed the need undergo therapy following surgical intervention  This will be set up for her today  Appropriate paperwork was filled  To Do Next Visit:    Sutures out no x-rays needed    General Discussions:       Operative Discussions:  Standard Consent: The risks and benefits of the procedure were explained to the patient, which include, but are not limited to: Bleeding, infection, recurrence, pain, scar, damage to tendons, damage to nerves, and damage to blood vessels, failure to give desired results and complications related to anesthesia  These risks, along with alternative conservative treatment options, and postoperative protocols were voiced back and understood by the patient  All questions were answered to the patient's satisfaction  The patient agrees to comply with a standard postoperative protocol, and is willing to proceed  Education was provided via written and auditory forms  There were no barriers to learning  Written handouts regarding wound care, incision and scar care, and general preoperative information was provided to the patient  Prior to surgery, the patient may be requested to stop all anti-inflammatory medications  Prophylactic aspirin, Plavix, and Coumadin may be allowed to be continued  Medications including vitamin E , ginkgo, and fish oil are requested to be stopped approximately one week prior to surgery  Hypertensive medications and beta blockers, if taken, should be continued        _____________________________________________________  CHIEF COMPLAINT:  Chief Complaint   Patient presents with    Left Wrist - Follow-up         SUBJECTIVE:  Ilan Labor is a 38 y o  female who presents  For evaluation regarding her left distal radius  She underwent previous open reduction internal fixation with a spanning plate dorsally  Today, she has well-healed  She complains of some discomfort for the plate  She has no numbness tingling fevers chills or constitutional symptoms  PAST MEDICAL HISTORY:  Past Medical History:   Diagnosis Date    Anxiety     Asthma     Depression     Hyperlipidemia     Hypertension     Migraine        PAST SURGICAL HISTORY:  Past Surgical History:   Procedure Laterality Date    CAST APPLICATION Left 60/49/1360    Procedure: Application short-arm splint left arm;  Surgeon: Alonzo Coppola MD;  Location: [facility];  Service: Orthopedics    ID OPEN [address] Left 12/15/2020    Procedure: Open reduction and internal fixation left-sided 3+ part distal radius ghzoxlut-yklnz-honxtyvca;  Surgeon: Alonzo Coppola MD;  Location: [facility];  Service: Orthopedics    TUBAL LIGATION         FAMILY HISTORY:  History reviewed  No pertinent family history      SOCIAL HISTORY:  Social History     Tobacco Use    Smoking status: Current Every Day Smoker     Packs/day: 0 50     Types: Cigarettes    Smokeless tobacco: Never Used   Substance Use Topics    Alcohol use: Not Currently    Drug use: Never       MEDICATIONS:    Current Outpatient Medications:     albuterol (PROVENTIL HFA,VENTOLIN HFA) 90 mcg/act inhaler, Inhale 2 puffs every 6 (six) hours as needed, Disp: , Rfl:     aspirin-acetaminophen-caffeine (EXCEDRIN MIGRAINE) 250-250-65 MG per tablet, Take 1 tablet by mouth every 6 (six) hours as needed for headaches, Disp: , Rfl:     escitalopram (LEXAPRO) 10 mg tablet, Take 10 mg by mouth as needed Takes at bedtime, Disp: , Rfl:     SUMAtriptan (IMITREX) 50 mg tablet, Take 50 mg by mouth 2 (two) times a day as needed, Disp: , Rfl:     topiramate (TOPAMAX) 50 MG tablet, Take 50 mg by mouth as needed , Disp: , Rfl:     ALLERGIES:  Allergies   Allergen Reactions    Shellfish-Derived Products Shortness Of Breath and Swelling    Sulfamethoxazole-Trimethoprim Hives     Bactrim    Penicillins Rash     From childhood       REVIEW OF SYSTEMS:  Pertinent items are noted in HPI  A comprehensive review of systems was negative  LABS:  HgA1c: No results found for: HGBA1C  BMP: No results found for: GLUCOSE, CALCIUM, NA, K, CO2, CL, BUN, CREATININE    _____________________________________________________  PHYSICAL EXAMINATION:  Vital signs: /79   Pulse 88   Ht 5' 8" (1 727 m)   Wt 92 1 kg (203 lb)   BMI 30 87 kg/m²   General: well developed and well nourished, alert, oriented times 3 and appears comfortable  Psychiatric: Normal  HEENT: Trachea Midline, No torticollis  Cardiovascular: No discernable arrhythmia  Pulmonary: No wheezing or stridor  Skin: No masses, erythema, lacerations, fluctation, ulcerations  Neurovascular: Sensation Intact to the Median, Ulnar, Radial Nerve, Motor Intact to the Median, Ulnar, Radial Nerve and Pulses Intact    MUSCULOSKELETAL EXAMINATION:  Extremities:    Left wrist with tenderness to palpation over the plate, patient has full digital flexion, full extension, no tenderness over the fracture site, remains neurologically intact left upper extremity        _____________________________________________________  STUDIES REVIEWED:    AP, lateral, and oblique x-rays of the left wrist reviewed demonstrates a well-healed distal radius fracture with internal spanning plate      PROCEDURES PERFORMED:  Procedures  No Procedures performed today

## 2021-03-19 NOTE — LETTER
March 19, 2021     Patient: Iraj Parsons   YOB: 1985   Date of Visit: 3/19/2021       To Whom it May Concern:    Dawood Flores is under my professional care  She was seen in my office on 3/19/2021  She will be undergoing operative intervention on 3/23/2021  She may return to work on 3/29/2021  she has a 5lb lifting restriction for 4 weeks  This includes lifting, pushing, pulling and twisting  If you have any questions or concerns, please don't hesitate to call  Sincerely,          Alean Greene MD        CC: Aguilar Rios

## 2021-03-19 NOTE — H&P
ASSESSMENT/PLAN:    Assessment:   Left wrist status post open reduction internal fixation with dorsal wrist  spanningplate    Plan:    hardware removal left wrist followed by physical therapy under general anesthesia in the operating room  We discussed the need undergo therapy following surgical intervention  This will be set up for her today  Appropriate paperwork was filled  To Do Next Visit:    Sutures out no x-rays needed    General Discussions:       Operative Discussions:  Standard Consent: The risks and benefits of the procedure were explained to the patient, which include, but are not limited to: Bleeding, infection, recurrence, pain, scar, damage to tendons, damage to nerves, and damage to blood vessels, failure to give desired results and complications related to anesthesia  These risks, along with alternative conservative treatment options, and postoperative protocols were voiced back and understood by the patient  All questions were answered to the patient's satisfaction  The patient agrees to comply with a standard postoperative protocol, and is willing to proceed  Education was provided via written and auditory forms  There were no barriers to learning  Written handouts regarding wound care, incision and scar care, and general preoperative information was provided to the patient  Prior to surgery, the patient may be requested to stop all anti-inflammatory medications  Prophylactic aspirin, Plavix, and Coumadin may be allowed to be continued  Medications including vitamin E , ginkgo, and fish oil are requested to be stopped approximately one week prior to surgery  Hypertensive medications and beta blockers, if taken, should be continued        _____________________________________________________  CHIEF COMPLAINT:  Chief Complaint   Patient presents with    Left Wrist - Follow-up         SUBJECTIVE:  Patito Paredes is a 38 y o  female who presents  For evaluation regarding her left distal radius  She underwent previous open reduction internal fixation with a spanning plate dorsally  Today, she has well-healed  She complains of some discomfort for the plate  She has no numbness tingling fevers chills or constitutional symptoms  PAST MEDICAL HISTORY:  Past Medical History:   Diagnosis Date    Anxiety     Asthma     Depression     Hyperlipidemia     Hypertension     Migraine        PAST SURGICAL HISTORY:  Past Surgical History:   Procedure Laterality Date    CAST APPLICATION Left 90/86/8031    Procedure: Application short-arm splint left arm;  Surgeon: Luna Ramirez MD;  [facility];  Service: Orthopedics    [place] OPEN [address] Left 12/15/2020    Procedure: Open reduction and internal fixation left-sided 3+ part distal radius [id number];  Surgeon: Luna Ramirez MD;  [facility];  Service: Orthopedics    TUBAL LIGATION         FAMILY HISTORY:  History reviewed  No pertinent family history      SOCIAL HISTORY:  Social History     Tobacco Use    Smoking status: Current Every Day Smoker     Packs/day: 0 50     Types: Cigarettes    Smokeless tobacco: Never Used   Substance Use Topics    Alcohol use: Not Currently    Drug use: Never       MEDICATIONS:    Current Outpatient Medications:     albuterol (PROVENTIL HFA,VENTOLIN HFA) 90 mcg/act inhaler, Inhale 2 puffs every 6 (six) hours as needed, Disp: , Rfl:     aspirin-acetaminophen-caffeine (EXCEDRIN MIGRAINE) 250-250-65 MG per tablet, Take 1 tablet by mouth every 6 (six) hours as needed for headaches, Disp: , Rfl:     escitalopram (LEXAPRO) 10 mg tablet, Take 10 mg by mouth as needed Takes at bedtime, Disp: , Rfl:     SUMAtriptan (IMITREX) 50 mg tablet, Take 50 mg by mouth 2 (two) times a day as needed, Disp: , Rfl:     topiramate (TOPAMAX) 50 MG tablet, Take 50 mg by mouth as needed , Disp: , Rfl:     ALLERGIES:  Allergies   Allergen Reactions    Shellfish-Derived Products Shortness Of Breath and Swelling    Sulfamethoxazole-Trimethoprim Hives     Bactrim    Penicillins Rash     From childhood       REVIEW OF SYSTEMS:  Pertinent items are noted in HPI  A comprehensive review of systems was negative  LABS:  HgA1c: No results found for: HGBA1C  BMP: No results found for: GLUCOSE, CALCIUM, NA, K, CO2, CL, BUN, CREATININE    _____________________________________________________  PHYSICAL EXAMINATION:  Vital signs: /79   Pulse 88   Ht 5' 8" (1 727 m)   Wt 92 1 kg (203 lb)   BMI 30 87 kg/m²   General: well developed and well nourished, alert, oriented times 3 and appears comfortable  Psychiatric: Normal  HEENT: Trachea Midline, No torticollis  Cardiovascular: No discernable arrhythmia  Pulmonary: No wheezing or stridor  Skin: No masses, erythema, lacerations, fluctation, ulcerations  Neurovascular: Sensation Intact to the Median, Ulnar, Radial Nerve, Motor Intact to the Median, Ulnar, Radial Nerve and Pulses Intact    MUSCULOSKELETAL EXAMINATION:  Extremities:    Left wrist with tenderness to palpation over the plate, patient has full digital flexion, full extension, no tenderness over the fracture site, remains neurologically intact left upper extremity        _____________________________________________________  STUDIES REVIEWED:    AP, lateral, and oblique x-rays of the left wrist reviewed demonstrates a well-healed distal radius fracture with internal spanning plate      PROCEDURES PERFORMED:  Procedures  No Procedures performed today

## 2021-03-19 NOTE — PROGRESS NOTES
ASSESSMENT/PLAN:    Assessment:   Left wrist status post open reduction internal fixation with dorsal wrist  spanningplate    Plan:    hardware removal left wrist followed by physical therapy under general anesthesia in the operating room  We discussed the need undergo therapy following surgical intervention  This will be set up for her today  Appropriate paperwork was filled  To Do Next Visit:    Sutures out no x-rays needed    General Discussions:       Operative Discussions:  Standard Consent: The risks and benefits of the procedure were explained to the patient, which include, but are not limited to: Bleeding, infection, recurrence, pain, scar, damage to tendons, damage to nerves, and damage to blood vessels, failure to give desired results and complications related to anesthesia  These risks, along with alternative conservative treatment options, and postoperative protocols were voiced back and understood by the patient  All questions were answered to the patient's satisfaction  The patient agrees to comply with a standard postoperative protocol, and is willing to proceed  Education was provided via written and auditory forms  There were no barriers to learning  Written handouts regarding wound care, incision and scar care, and general preoperative information was provided to the patient  Prior to surgery, the patient may be requested to stop all anti-inflammatory medications  Prophylactic aspirin, Plavix, and Coumadin may be allowed to be continued  Medications including vitamin E , ginkgo, and fish oil are requested to be stopped approximately one week prior to surgery  Hypertensive medications and beta blockers, if taken, should be continued        _____________________________________________________  CHIEF COMPLAINT:  Chief Complaint   Patient presents with    Left Wrist - Follow-up         SUBJECTIVE:  Navin Khan is a 38 y o  female who presents  For evaluation regarding her left distal radius  She underwent previous open reduction internal fixation with a spanning plate dorsally  Today, she has well-healed  She complains of some discomfort for the plate  She has no numbness tingling fevers chills or constitutional symptoms  PAST MEDICAL HISTORY:  Past Medical History:   Diagnosis Date    Anxiety     Asthma     Depression     Hyperlipidemia     Hypertension     Migraine        PAST SURGICAL HISTORY:  Past Surgical History:   Procedure Laterality Date    CAST APPLICATION Left 23/42/6538    Procedure: Application short-arm splint left arm;  Surgeon: Missy Corbin MD;  [facility];  Service: Orthopedics    [place] OPEN [address] Left 12/15/2020    Procedure: Open reduction and internal fixation left-sided 3+ part distal radius [id number]-[id number];  Surgeon: Missy Corbin MD;  [facility];  Service: Orthopedics    TUBAL LIGATION         FAMILY HISTORY:  History reviewed  No pertinent family history      SOCIAL HISTORY:  Social History     Tobacco Use    Smoking status: Current Every Day Smoker     Packs/day: 0 50     Types: Cigarettes    Smokeless tobacco: Never Used   Substance Use Topics    Alcohol use: Not Currently    Drug use: Never       MEDICATIONS:    Current Outpatient Medications:     albuterol (PROVENTIL HFA,VENTOLIN HFA) 90 mcg/act inhaler, Inhale 2 puffs every 6 (six) hours as needed, Disp: , Rfl:     aspirin-acetaminophen-caffeine (EXCEDRIN MIGRAINE) 250-250-65 MG per tablet, Take 1 tablet by mouth every 6 (six) hours as needed for headaches, Disp: , Rfl:     escitalopram (LEXAPRO) 10 mg tablet, Take 10 mg by mouth as needed Takes at bedtime, Disp: , Rfl:     SUMAtriptan (IMITREX) 50 mg tablet, Take 50 mg by mouth 2 (two) times a day as needed, Disp: , Rfl:     topiramate (TOPAMAX) 50 MG tablet, Take 50 mg by mouth as needed , Disp: , Rfl:     ALLERGIES:  Allergies   Allergen Reactions    Shellfish-Derived Products Shortness Of Breath and Swelling    Sulfamethoxazole-Trimethoprim Hives     Bactrim    Penicillins Rash     From childhood       REVIEW OF SYSTEMS:  Pertinent items are noted in HPI  A comprehensive review of systems was negative  LABS:  HgA1c: No results found for: HGBA1C  BMP: No results found for: GLUCOSE, CALCIUM, NA, K, CO2, CL, BUN, CREATININE    _____________________________________________________  PHYSICAL EXAMINATION:  Vital signs: /79   Pulse 88   Ht 5' 8" (1 727 m)   Wt 92 1 kg (203 lb)   BMI 30 87 kg/m²   General: well developed and well nourished, alert, oriented times 3 and appears comfortable  Psychiatric: Normal  HEENT: Trachea Midline, No torticollis  Cardiovascular: No discernable arrhythmia  Pulmonary: No wheezing or stridor  Skin: No masses, erythema, lacerations, fluctation, ulcerations  Neurovascular: Sensation Intact to the Median, Ulnar, Radial Nerve, Motor Intact to the Median, Ulnar, Radial Nerve and Pulses Intact    MUSCULOSKELETAL EXAMINATION:  Extremities:    Left wrist with tenderness to palpation over the plate, patient has full digital flexion, full extension, no tenderness over the fracture site, remains neurologically intact left upper extremity        _____________________________________________________  STUDIES REVIEWED:    AP, lateral, and oblique x-rays of the left wrist reviewed demonstrates a well-healed distal radius fracture with internal spanning plate      PROCEDURES PERFORMED:  Procedures  No Procedures performed today

## 2021-03-23 ENCOUNTER — ANESTHESIA EVENT (OUTPATIENT)
Dept: PERIOP | Facility: HOSPITAL | Age: 36
End: 2021-03-23
Payer: COMMERCIAL

## 2021-03-23 ENCOUNTER — ANESTHESIA (OUTPATIENT)
Dept: PERIOP | Facility: HOSPITAL | Age: 36
End: 2021-03-23
Payer: COMMERCIAL

## 2021-03-23 ENCOUNTER — HOSPITAL ENCOUNTER (OUTPATIENT)
Facility: HOSPITAL | Age: 36
Setting detail: OUTPATIENT SURGERY
Discharge: HOME | End: 2021-03-23
Attending: ORTHOPAEDIC SURGERY | Admitting: ORTHOPAEDIC SURGERY
Payer: COMMERCIAL

## 2021-03-23 ENCOUNTER — HOSPITAL ENCOUNTER (OUTPATIENT)
Dept: RADIOLOGY | Facility: HOSPITAL | Age: 36
Setting detail: OUTPATIENT SURGERY
Discharge: HOME | End: 2021-03-23
Payer: COMMERCIAL

## 2021-03-23 VITALS
DIASTOLIC BLOOD PRESSURE: 68 MMHG | HEART RATE: 92 BPM | OXYGEN SATURATION: 100 % | SYSTOLIC BLOOD PRESSURE: 112 MMHG | TEMPERATURE: 97.7 F | RESPIRATION RATE: 14 BRPM | BODY MASS INDEX: 30.31 KG/M2 | HEIGHT: 68 IN | WEIGHT: 200 LBS

## 2021-03-23 DIAGNOSIS — S52.592D OTHER CLOSED FRACTURE OF DISTAL END OF LEFT RADIUS WITH ROUTINE HEALING, SUBSEQUENT ENCOUNTER: Primary | ICD-10-CM

## 2021-03-23 DIAGNOSIS — T84.84XA PAINFUL ORTHOPAEDIC HARDWARE (HCC): ICD-10-CM

## 2021-03-23 PROCEDURE — 73100 X-RAY EXAM OF WRIST: CPT

## 2021-03-23 PROCEDURE — 20680 REMOVAL OF IMPLANT DEEP: CPT | Performed by: ORTHOPAEDIC SURGERY

## 2021-03-23 PROCEDURE — 20680 REMOVAL OF IMPLANT DEEP: CPT | Performed by: PHYSICIAN ASSISTANT

## 2021-03-23 RX ORDER — NAPROXEN 250 MG/1
250 TABLET ORAL 2 TIMES DAILY WITH MEALS
Status: DISCONTINUED | OUTPATIENT
Start: 2021-03-23 | End: 2021-03-23 | Stop reason: HOSPADM

## 2021-03-23 RX ORDER — NAPROXEN SODIUM 220 MG
220 TABLET ORAL 2 TIMES DAILY WITH MEALS
Qty: 20 TABLET | Refills: 0 | Status: SHIPPED | OUTPATIENT
Start: 2021-03-23

## 2021-03-23 RX ORDER — SODIUM CHLORIDE FOR INHALATION 0.9 %
VIAL, NEBULIZER (ML) INHALATION
Status: DISCONTINUED
Start: 2021-03-23 | End: 2021-03-23 | Stop reason: HOSPADM

## 2021-03-23 RX ORDER — HYDROCODONE BITARTRATE AND ACETAMINOPHEN 5; 325 MG/1; MG/1
1 TABLET ORAL EVERY 6 HOURS PRN
Status: DISCONTINUED | OUTPATIENT
Start: 2021-03-23 | End: 2021-03-23 | Stop reason: HOSPADM

## 2021-03-23 RX ORDER — PROPOFOL 10 MG/ML
INJECTION, EMULSION INTRAVENOUS AS NEEDED
Status: DISCONTINUED | OUTPATIENT
Start: 2021-03-23 | End: 2021-03-23

## 2021-03-23 RX ORDER — CLINDAMYCIN PHOSPHATE 900 MG/50ML
900 INJECTION INTRAVENOUS ONCE
Status: COMPLETED | OUTPATIENT
Start: 2021-03-23 | End: 2021-03-23

## 2021-03-23 RX ORDER — SODIUM CHLORIDE, SODIUM LACTATE, POTASSIUM CHLORIDE, CALCIUM CHLORIDE 600; 310; 30; 20 MG/100ML; MG/100ML; MG/100ML; MG/100ML
50 INJECTION, SOLUTION INTRAVENOUS CONTINUOUS
Status: DISCONTINUED | OUTPATIENT
Start: 2021-03-23 | End: 2021-03-23 | Stop reason: HOSPADM

## 2021-03-23 RX ORDER — ONDANSETRON 2 MG/ML
INJECTION INTRAMUSCULAR; INTRAVENOUS AS NEEDED
Status: DISCONTINUED | OUTPATIENT
Start: 2021-03-23 | End: 2021-03-23

## 2021-03-23 RX ORDER — LIDOCAINE HYDROCHLORIDE 10 MG/ML
INJECTION, SOLUTION EPIDURAL; INFILTRATION; INTRACAUDAL; PERINEURAL AS NEEDED
Status: DISCONTINUED | OUTPATIENT
Start: 2021-03-23 | End: 2021-03-23 | Stop reason: HOSPADM

## 2021-03-23 RX ORDER — DEXAMETHASONE SODIUM PHOSPHATE 10 MG/ML
INJECTION, SOLUTION INTRAMUSCULAR; INTRAVENOUS AS NEEDED
Status: DISCONTINUED | OUTPATIENT
Start: 2021-03-23 | End: 2021-03-23

## 2021-03-23 RX ORDER — LIDOCAINE HYDROCHLORIDE 10 MG/ML
INJECTION, SOLUTION EPIDURAL; INFILTRATION; INTRACAUDAL; PERINEURAL AS NEEDED
Status: DISCONTINUED | OUTPATIENT
Start: 2021-03-23 | End: 2021-03-23

## 2021-03-23 RX ORDER — ROPIVACAINE HYDROCHLORIDE 5 MG/ML
INJECTION, SOLUTION EPIDURAL; INFILTRATION; PERINEURAL AS NEEDED
Status: DISCONTINUED | OUTPATIENT
Start: 2021-03-23 | End: 2021-03-23

## 2021-03-23 RX ORDER — ALBUTEROL SULFATE 2.5 MG/3ML
2.5 SOLUTION RESPIRATORY (INHALATION) ONCE
Status: DISCONTINUED | OUTPATIENT
Start: 2021-03-23 | End: 2021-03-23

## 2021-03-23 RX ORDER — METOCLOPRAMIDE HYDROCHLORIDE 5 MG/ML
10 INJECTION INTRAMUSCULAR; INTRAVENOUS ONCE AS NEEDED
Status: DISCONTINUED | OUTPATIENT
Start: 2021-03-23 | End: 2021-03-23 | Stop reason: HOSPADM

## 2021-03-23 RX ORDER — FENTANYL CITRATE/PF 50 MCG/ML
50 SYRINGE (ML) INJECTION
Status: DISCONTINUED | OUTPATIENT
Start: 2021-03-23 | End: 2021-03-23 | Stop reason: HOSPADM

## 2021-03-23 RX ORDER — MAGNESIUM HYDROXIDE 1200 MG/15ML
LIQUID ORAL AS NEEDED
Status: DISCONTINUED | OUTPATIENT
Start: 2021-03-23 | End: 2021-03-23 | Stop reason: HOSPADM

## 2021-03-23 RX ORDER — HYDROCODONE BITARTRATE AND ACETAMINOPHEN 5; 325 MG/1; MG/1
1 TABLET ORAL EVERY 6 HOURS PRN
Qty: 5 TABLET | Refills: 0 | Status: SHIPPED | OUTPATIENT
Start: 2021-03-23 | End: 2021-03-28

## 2021-03-23 RX ORDER — FENTANYL CITRATE 50 UG/ML
INJECTION, SOLUTION INTRAMUSCULAR; INTRAVENOUS AS NEEDED
Status: DISCONTINUED | OUTPATIENT
Start: 2021-03-23 | End: 2021-03-23

## 2021-03-23 RX ORDER — ACETAMINOPHEN 325 MG/1
650 TABLET ORAL EVERY 6 HOURS PRN
Status: DISCONTINUED | OUTPATIENT
Start: 2021-03-23 | End: 2021-03-23 | Stop reason: HOSPADM

## 2021-03-23 RX ORDER — MIDAZOLAM HYDROCHLORIDE 2 MG/2ML
INJECTION, SOLUTION INTRAMUSCULAR; INTRAVENOUS AS NEEDED
Status: DISCONTINUED | OUTPATIENT
Start: 2021-03-23 | End: 2021-03-23

## 2021-03-23 RX ORDER — ONDANSETRON 2 MG/ML
4 INJECTION INTRAMUSCULAR; INTRAVENOUS ONCE AS NEEDED
Status: DISCONTINUED | OUTPATIENT
Start: 2021-03-23 | End: 2021-03-23 | Stop reason: HOSPADM

## 2021-03-23 RX ORDER — SENNOSIDES 8.6 MG
650 CAPSULE ORAL EVERY 8 HOURS PRN
Qty: 30 TABLET | Refills: 0 | Status: SHIPPED | OUTPATIENT
Start: 2021-03-23

## 2021-03-23 RX ADMIN — ONDANSETRON 4 MG: 2 INJECTION INTRAMUSCULAR; INTRAVENOUS at 11:12

## 2021-03-23 RX ADMIN — SODIUM CHLORIDE, SODIUM LACTATE, POTASSIUM CHLORIDE, AND CALCIUM CHLORIDE 50 ML/HR: .6; .31; .03; .02 INJECTION, SOLUTION INTRAVENOUS at 10:25

## 2021-03-23 RX ADMIN — PHENYLEPHRINE HYDROCHLORIDE 100 MCG: 10 INJECTION INTRAVENOUS at 11:40

## 2021-03-23 RX ADMIN — ROPIVACAINE HYDROCHLORIDE 30 ML: 5 INJECTION, SOLUTION EPIDURAL; INFILTRATION; PERINEURAL at 10:40

## 2021-03-23 RX ADMIN — FENTANYL CITRATE 25 MCG: 50 INJECTION INTRAMUSCULAR; INTRAVENOUS at 11:52

## 2021-03-23 RX ADMIN — FENTANYL CITRATE 50 MCG: 50 INJECTION INTRAMUSCULAR; INTRAVENOUS at 10:39

## 2021-03-23 RX ADMIN — ALBUTEROL SULFATE 2.5 MG: 2.5 SOLUTION RESPIRATORY (INHALATION) at 12:22

## 2021-03-23 RX ADMIN — ALBUTEROL SULFATE 2.5 MG: 2.5 SOLUTION RESPIRATORY (INHALATION) at 12:29

## 2021-03-23 RX ADMIN — PHENYLEPHRINE HYDROCHLORIDE 200 MCG: 10 INJECTION INTRAVENOUS at 11:46

## 2021-03-23 RX ADMIN — PHENYLEPHRINE HYDROCHLORIDE 100 MCG: 10 INJECTION INTRAVENOUS at 11:19

## 2021-03-23 RX ADMIN — PHENYLEPHRINE HYDROCHLORIDE 100 MCG: 10 INJECTION INTRAVENOUS at 11:31

## 2021-03-23 RX ADMIN — PROPOFOL 200 MG: 10 INJECTION, EMULSION INTRAVENOUS at 10:54

## 2021-03-23 RX ADMIN — DEXAMETHASONE SODIUM PHOSPHATE 10 MG: 10 INJECTION, SOLUTION INTRAMUSCULAR; INTRAVENOUS at 11:00

## 2021-03-23 RX ADMIN — MIDAZOLAM 2 MG: 1 INJECTION INTRAMUSCULAR; INTRAVENOUS at 10:39

## 2021-03-23 RX ADMIN — LIDOCAINE HYDROCHLORIDE 50 MG: 10 INJECTION, SOLUTION EPIDURAL; INFILTRATION; INTRACAUDAL; PERINEURAL at 10:54

## 2021-03-23 RX ADMIN — CLINDAMYCIN PHOSPHATE 900 MG: 900 INJECTION, SOLUTION INTRAVENOUS at 10:57

## 2021-03-23 RX ADMIN — FENTANYL CITRATE 25 MCG: 50 INJECTION INTRAMUSCULAR; INTRAVENOUS at 11:01

## 2021-03-23 NOTE — ANESTHESIA PROCEDURE NOTES
Peripheral Block    Patient location during procedure: holding area  Start time: 3/23/2021 10:40 AM  Reason for block: at surgeon's request and post-op pain management  Staffing  Anesthesiologist: nAna Mackey MD  Performed: anesthesiologist   Preanesthetic Checklist  Completed: patient identified, site marked, surgical consent, pre-op evaluation, timeout performed, IV checked, risks and benefits discussed and monitors and equipment checked  Peripheral Block  Patient position: supine  Prep: ChloraPrep  Patient monitoring: continuous pulse ox and frequent blood pressure checks  Block type: supraclavicular  Laterality: left  Injection technique: single-shot  Procedures: ultrasound guided, Ultrasound guidance required for the procedure to increase accuracy and safety of medication placement and decrease risk of complications    Ultrasound permanent image saved  Needle  Needle type: Stimuplex   Needle gauge: 22 G  Needle length: 5 cm  Needle localization: ultrasound guidance  Test dose: negative  Assessment  Injection assessment: incremental injection and local visualized surrounding nerve on ultrasound  Paresthesia pain: none  Post-procedure:  site cleaned  patient tolerated the procedure well with no immediate complications

## 2021-03-23 NOTE — PERIOPERATIVE NURSING NOTE
Pt c/o left eye feeling weird  Pt denies pain and Dr Yrn Mishra assessed her  Letter for returning for work given to pt at discharge

## 2021-03-23 NOTE — ANESTHESIA POSTPROCEDURE EVALUATION
Post-Op Assessment Note    No complications documented      /65 (03/23/21 1158)    Temp (!) 97 4 °F (36 3 °C) (03/23/21 1158)    Pulse 84 (03/23/21 1158)   Resp   15   SpO2   99%

## 2021-03-23 NOTE — ANESTHESIA PREPROCEDURE EVALUATION
Procedure:  REMOVAL HARDWARE left wrist spanning plate (Left Wrist)    Relevant Problems   NEURO/PSYCH   (+) Anxiety   (+) Headache      PULMONARY   (+) Asthma        Physical Exam    Airway    Mallampati score: II  TM Distance: >3 FB  Neck ROM: full     Dental       Cardiovascular      Pulmonary      Other Findings        Anesthesia Plan  ASA Score- 2     Anesthesia Type- general with ASA Monitors  Additional Monitors:   Airway Plan: LMA  Comment: SC Block for post op pain per surgeon request          Plan Factors-    Chart reviewed  Induction-     Postoperative Plan-     Informed Consent- Anesthetic plan and risks discussed with patient  I personally reviewed this patient with the CRNA  Discussed and agreed on the Anesthesia Plan with the CRNA  Adelaide De La Cruz

## 2021-03-23 NOTE — INTERVAL H&P NOTE
H&P reviewed  After examining the patient I find no changes in the patients condition since the H&P had been written      Vitals:    03/23/21 1008   BP: 123/75   Pulse: 99   Resp: 18   Temp: 98 5 °F (36 9 °C)   SpO2: 99%

## 2021-03-23 NOTE — OP NOTE
OPERATIVE REPORT  PATIENT NAME: Froy Coronado  :  1985  MRN: [id number]  Pt Location: [facility]    SURGERY DATE: 21    Surgeon(s) and Role:     * Alden Hutton MD - Primary     * Amrik Lau PA-C - Assisting    Pre-Op Diagnosis:  Painful orthopaedic hardware [facility]) [facility] [place]    Post-Op Diagnosis:    Painful orthopaedic hardware ([address] ) [T84 [id number]]    Procedure(s) (LRB):  REMOVAL HARDWARE left wrist spanning plate (Left)    Specimen(s):  * No orders in the log *    Estimated Blood Loss:   Minimal      Anesthesia Type:   General    Operative Indications: The patient has a history of a left distal radius fracture that underwent internal distraction plating   The decision was made to bring the patient to the operating room for removal of hardware from the left wrist   Risks of the procedure were explained which include, but are not limited to bleeding; infection; damage to nerves, arteries,veins, tendons; scar; pain; need for reoperation; failure to give desired result; and risks of anaesthesia  All questions were answered to satisfaction and they were willing to proceed  Operative Findings:  Healed distal radius fracture on the left  After plate removal, range of motion 50 extension, 40 flexion  Complications:   None    Procedure and Technique:  After the patient, site, and procedure were identified, the patient was brought into the operating room in a supine position  General anaesthesia and local medication were provided  A well padded tourniquet was applied to the extremity, set at 250 mmHg  The  left upper extremity was then prepped and drapped in a normal, sterile, orthopedic fashion  After the patient, site, and procedure identified attention was turned towards the left arm  An Esmarch bandage was used to exsanguinate the limb and the tourniquet was inflated to 250 mmHg    Two incisions were then made, 1 over the long finger metacarpal and 1 over the radial shaft in line with the previous incisions  We dissected down through skin subcutaneous tissues on both incisions extensor tendons were identified on both and were properly retracted and protected to we came down to the level of the plate  There were 4 separate screws distally and 4 separate screws proximally  All 8 of the screws were removed  Once this was done, the plate was removed in its entirety  AP and lateral radiographs of the hand, wrist, and forearm were taken which confirmed good healing of the distal radius fracture and complete hardware removal   Patient was able to get 50° of extension to the wrist in 40° of flexion to the wrist today after plate removal   We were satisfied with the overall procedure at this point and tourniquet was deflated  At the completion of the procedure, hemostasis was obtained with cautery and direct pressure  The wounds were copiously irrigated with sterile solution  The wounds were closed with Vicryl and Prolene  Sterile dressings were applied, including Xeroform, gauze, tweeners, webril, ACE  Please note, all sponge, needle, and instrument counts were correct prior to closure  Loupe magnification was utilized  The patient tolerated the procedure well       I was present for all critical portions of the procedure, A qualified resident physician was not available and A physician assistant was required during the procedure for retraction tissue handling,dissection and suturing    Patient Disposition:  PACU , hemodynamically stable and extubated and stable    SIGNATURE: Adrian Boswell MD  DATE: 03/23/21  TIME: 12:05 PM

## 2021-03-23 NOTE — DISCHARGE INSTRUCTIONS
Post Operative Instructions    You have had surgery on your arm today, please read and follow the information below:  · Elevate your hand above your elbow during the next 24-48 hours to help with swelling  · Place your hand and arm over your head with motion at your shoulder three times a day  · Do not apply any cream/ointment/oil to your incisions including antibiotics  · Do not soak your hands in standing water (dishwater, tubs, Jacuzzi's, pools, etc ) until given permission (typically 2-3 weeks after injury)    Call the office if you notice any:  · Increased numbness or tingling of your hand or fingers that is not relieved with elevation  · Increasing pain that is not controlled with medication  · Difficulty chewing, breathing, swallowing  · Chest pains or shortness of breath  · Fever over 101 4 degrees  Bandage: Do NOT remove bandage until follow-up appointment  Motion: Move fingers into a fist 5 times a day, DO NOT move any splinted fingers  Weight bearing status: Avoid heavy lifting (>5 pounds) with the extremity that was operated on until follow up appointment  Normal activities of daily living are OK  Ice: Ice for 10 minutes every hour as needed for swelling x 24 hours  Sling: Sling for comfort for 2-3 days  Medications:   Naproxen 220 mg two times a day   Tylenol Extended Release 650 mg every 8 hours  Norco/Hydrocodone one tab every 6 hours AS NEEDED for pain     Follow-up Appointment: 7-10 days  Please call the office if you have any questions or concerns regarding your post-operative care

## 2021-03-23 NOTE — ANESTHESIA POSTPROCEDURE EVALUATION
Post-Op Assessment Note    CV Status:  Stable  Pain Score: 0    Pain management: adequate     Mental Status:  Alert and awake   Hydration Status:  Euvolemic   PONV Controlled:  Controlled   Airway Patency:  Patent      Post Op Vitals Reviewed: Yes      Staff: CRNA         No complications documented      /65 (03/23/21 1158)    Temp (!) 97 4 °F (36 3 °C) (03/23/21 1158)    Pulse 84 (03/23/21 1158)   Resp      SpO2

## 2021-04-01 ENCOUNTER — OFFICE VISIT (OUTPATIENT)
Dept: OBGYN CLINIC | Facility: HOSPITAL | Age: 36
End: 2021-04-01

## 2021-04-01 VITALS
WEIGHT: 204.6 LBS | HEIGHT: 68 IN | DIASTOLIC BLOOD PRESSURE: 76 MMHG | BODY MASS INDEX: 31.01 KG/M2 | HEART RATE: 118 BPM | SYSTOLIC BLOOD PRESSURE: 115 MMHG

## 2021-04-01 DIAGNOSIS — Z47.89 AFTERCARE FOLLOWING SURGERY OF THE MUSCULOSKELETAL SYSTEM: ICD-10-CM

## 2021-04-01 DIAGNOSIS — Z98.890 S/P ORIF (OPEN REDUCTION INTERNAL FIXATION) FRACTURE: Primary | ICD-10-CM

## 2021-04-01 DIAGNOSIS — Z87.81 S/P ORIF (OPEN REDUCTION INTERNAL FIXATION) FRACTURE: Primary | ICD-10-CM

## 2021-04-01 PROCEDURE — 99024 POSTOP FOLLOW-UP VISIT: CPT | Performed by: PHYSICIAN ASSISTANT

## 2021-04-01 NOTE — PROGRESS NOTES
Assessment:   S/P REMOVAL HARDWARE left wrist spanning plate - Left on 3/42/3030    Plan:   therapy and activity modification  -  Patient to start working on range of motion at this time  She is not to start strengthening until she gains her motion back  Follow Up:  6 weeks    To Do Next Visit:  Re-evaluate current conditions      CHIEF COMPLAINT:  Chief Complaint   Patient presents with    Left Wrist - Post-op         SUBJECTIVE:  Maame Salinas is a 38 y o  female who presents for follow up after REMOVAL HARDWARE left wrist spanning plate - Left on 2/14/2112  Today patient has some soreness in the arm  She denies any fevers, but has been feeling "unwell" with chills, night sweats, and some GI upset  She denies any COVID exposures  She denies any numbness or tingling  She denies any other acute complaints          PHYSICAL EXAMINATION:  Vital signs: /76   Pulse (!) 118   Ht 5' 8" (1 727 m)   Wt 92 8 kg (204 lb 9 6 oz)   BMI 31 11 kg/m²   General: well developed and well nourished, alert, oriented times 3 and appears comfortable  Psychiatric: Normal    MUSCULOSKELETAL EXAMINATION:  Incision: Clean, dry, intact  Range of Motion: Limited due to stiffness  Neurovascular status: Neuro intact, good cap refill  Activity Restrictions: No restrictions  Done today: Sutures out and Steri strips applied      STUDIES REVIEWED:  No Studies to review      PROCEDURES PERFORMED:  Procedures  No Procedures performed today

## 2021-04-01 NOTE — LETTER
April 1, 2021     Patient: Jules Lind   YOB: 1985   Date of Visit: 4/1/2021       To Whom it May Concern:    Binh Florenceelba is under my professional care  She was seen in my office on 4/1/2021  She may return to work with limitations on 4/7/2021  She cannot do any lifting, pulling or pushing activities with the left hand that exceed 10lbs  If you have any questions or concerns, please don't hesitate to call  Sincerely,          John Kemp PA-C        CC: Constantin Donovan

## 2021-04-12 ENCOUNTER — EVALUATION (OUTPATIENT)
Dept: OCCUPATIONAL THERAPY | Facility: CLINIC | Age: 36
End: 2021-04-12
Payer: COMMERCIAL

## 2021-04-12 DIAGNOSIS — S52.602D TRAUMATIC CLOSED DISPLACED FRACTURE OF DISTAL END OF RADIUS AND ULNA, LEFT, WITH ROUTINE HEALING, SUBSEQUENT ENCOUNTER: Primary | ICD-10-CM

## 2021-04-12 DIAGNOSIS — S52.502D TRAUMATIC CLOSED DISPLACED FRACTURE OF DISTAL END OF RADIUS AND ULNA, LEFT, WITH ROUTINE HEALING, SUBSEQUENT ENCOUNTER: Primary | ICD-10-CM

## 2021-04-12 PROCEDURE — 97165 OT EVAL LOW COMPLEX 30 MIN: CPT | Performed by: OCCUPATIONAL THERAPIST

## 2021-04-12 PROCEDURE — 97110 THERAPEUTIC EXERCISES: CPT | Performed by: OCCUPATIONAL THERAPIST

## 2021-04-12 NOTE — PROGRESS NOTES
OT Evaluation     Today's date: 2021  Patient name: Peidad Ramirez  : 1985  MRN: [id number]  Referring provider: Mary Gibson MD  Dx:   Encounter Diagnosis     ICD-10-CM    1  Traumatic closed displaced fracture of distal end of radius and ulna, left, with routine healing, subsequent encounter  S52 502D     S52 602D                   Assessment  Assessment details: Omi Traylor is a LHD female that is referred s/p left distal radius ORIF with spanning plate on , hardware removed on 3/23/21  She presents with pain, edema, and sensory disturbance in the hand  Her MCP flexion is limited due to soft tissue tightness  Wrist motion is also significantly reduced from the contralateral side   and pinch strength is limited and assessment painful  She will benefit from therapy to focus on motion in her left dominant hand with eventual strengthening for return to full function  See below for a detailed assessment  Impairments: abnormal or restricted ROM, activity intolerance, impaired physical strength, lacks appropriate home exercise program and pain with function  Understanding of Dx/Px/POC: good   Prognosis: good    Goals  STG: Patient will be compliant with home exercise program in 1 week  STG: Pain will not exceed a 3/10 during appropriate activity and during therapy in 4 weeks  STG: Inflammation/edema will be reduced to 16 8cm in the wrist and patient will be independent with self management techniques in 4 weeks  STG: Range of motion of left wrist will be improved to flexion= 35, extension=40, radial deviation=14, ulnar deviation= 23 degrees in 4 weeks  STG: Range of motion of the left digits will be improved to full in order to form a full composite fist and achieve at least 90 degrees of MCP flexion in 4 weeks  STG: Strength will be improved to =18 pounds, lateral pinch=7 5 pounds, 3 point pinch=3 5 pounds in 4 weeks       LTG: Range of motion of left wrist will be improved to flexion= 55, extension=55, radial deviation=17, ulnar deviation= 27 degrees in 6-8 weeks or discharge  LTG: Strength will be improved to 50% the contralateral side in 6-8 weeks or discharge  LTG: Performance in ADLs and IADLS will be improved to prior level of function with the affected extremity within 6 weeks or discharge  LTG: Performance in work activity will be improved to prior level of function with the affected extremity within 6 weeks or discharge  LTG: FOTO score increase by 20 points within 6 weeks or discharge  Plan  Plan details: Treatment to include modalities, manual therapy, PRE's, HEP, and orthotics as appropriate  Patient would benefit from: skilled OT and OT eval  Planned modality interventions: thermotherapy: hydrocollator packs  Planned therapy interventions: home exercise program, joint mobilization, manual therapy, therapeutic exercise, patient education, therapeutic activities, stretching and strengthening  Frequency: 2x week  Duration in visits: [address] beginning date: 2021  Plan of Care expiration date: 2021  Treatment plan discussed with: patient        Subjective Evaluation    History of Present Illness  Date of onset: 2020  Date of surgery: 3/23/2021  Mechanism of injury: surgery  Mechanism of injury: She was roller skating and fell on 12/3/20  She braced herself with her left hand when she fell  She underwent a left wrist ORIF with dorsal spanning plate on   Her hardware was removed on 3/23/21     Pain  At worst pain ratin  Location: Dorsal wrist/forearm, ulnar sided hand   Quality: dull ache (Soreness)    Social Support    Employment status: working [place] at [employer] [place]  Hand dominance: left    Treatments  Current treatment: occupational therapy  Patient Goals  Patient goals for therapy: decreased pain, decreased edema, increased motion, increased strength and independence with ADLs/IADLs          Objective     Observations     Left Wrist/Hand   Positive for adhesive scar  Neurological Testing     Additional Neurological Details  Paresthesias along ulnar sensory nerve  Active Range of Motion     Left Elbow   Forearm pronation: 75 degrees     Left Wrist   Wrist flexion: 19 degrees   Wrist extension: 28 degrees   Radial deviation: 10 degrees   Ulnar deviation: 14 degrees     Right Wrist   Wrist flexion: 72 degrees   Wrist extension: 61 degrees   Radial deviation: 17 degrees   Ulnar deviation: 33 degrees     Left Thumb   Flexion     MP: 49 degrees    DIP: 65 degrees  Palmar Abduction     CMC: 49 degrees  Radial abduction    CMC: 60 degrees    Right Thumb   Flexion     MP: 53    DIP: 50  Palmar Abduction    CMC: 55  Radial Abduction    CMC: 69    Left Digits    Flexion   Index     MCP: 42  Middle     MCP: 40  Ring     MCP: 51  Little     MCP: 53    Strength/Myotome Testing     Left Wrist/Hand      (2nd hand position)     Trial 1: 8 1    Thumb Strength  Key/Lateral Pinch     Trial 1: 4 5  Palmar/Three-Point Pinch     Trial 1: 1 8    Right Wrist/Hand      (2nd hand position)     Trial 1: 61 1    Thumb Strength   Key/Lateral Pinch     Trial 1: 13 6  Palmar/Three-Point Pinch     Trial 1: 10 2    Tests     Left Wrist/Hand   Positive extrinsic extensor tightness and intrinsic muscle tightness       Swelling     Left Wrist/Hand   Circumference wrist: 17 2 cm    Right Wrist/Hand   Circumference wrist: 16 2 cm             Precautions: No strengthening until ROM return      Manuals 4/12            Scar massage 5'                                                   Neuro Re-Ed                                                                                                        Ther Ex             HEP: tendon gliding, MCP stretch, thumb abduction stretch, wrist PROM, prayer stretch Issued            Wrist stretching 5'            Intrinsic stretches             Extrinsic stretches              Wrist maze             Wall walking Supination/pronation with john             Keymaurice                          Ther Activity             Gripping                          Gait Training                                       Modalities             P 5'

## 2021-04-19 ENCOUNTER — APPOINTMENT (OUTPATIENT)
Dept: OCCUPATIONAL THERAPY | Facility: CLINIC | Age: 36
End: 2021-04-19
Payer: COMMERCIAL

## 2021-04-20 ENCOUNTER — TELEPHONE (OUTPATIENT)
Dept: OBGYN CLINIC | Facility: HOSPITAL | Age: 36
End: 2021-04-20

## 2021-04-20 NOTE — TELEPHONE ENCOUNTER
Patient is calling in requesting a new work note  She would like to go back to work on dull duty  She needs this note done by tomorrow the latest  She would like to be faxed         Please fax work note to #[phone] att: Shawnee Loera

## 2021-04-23 ENCOUNTER — OFFICE VISIT (OUTPATIENT)
Dept: OCCUPATIONAL THERAPY | Facility: CLINIC | Age: 36
End: 2021-04-23
Payer: COMMERCIAL

## 2021-04-23 DIAGNOSIS — S52.502D TRAUMATIC CLOSED DISPLACED FRACTURE OF DISTAL END OF RADIUS AND ULNA, LEFT, WITH ROUTINE HEALING, SUBSEQUENT ENCOUNTER: Primary | ICD-10-CM

## 2021-04-23 DIAGNOSIS — S52.602D TRAUMATIC CLOSED DISPLACED FRACTURE OF DISTAL END OF RADIUS AND ULNA, LEFT, WITH ROUTINE HEALING, SUBSEQUENT ENCOUNTER: Primary | ICD-10-CM

## 2021-04-23 PROCEDURE — 97110 THERAPEUTIC EXERCISES: CPT

## 2021-04-23 NOTE — PROGRESS NOTES
Daily Note     Today's date: 2021  Patient name: Mahesh Torres  : 1985  MRN: [id number]  Referring provider: Manuel Edwards MD  Dx:   Encounter Diagnosis     ICD-10-CM    1  Traumatic closed displaced fracture of distal end of radius and ulna, left, with routine healing, subsequent encounter  S52 502D     S52 602D                   Subjective: It hurts a little sometimes  Objective: See treatment diary below      Assessment: Tolerated treatment well  Patient demonstrated fatigue post treatment and would benefit from continued OT      Plan: Continue per plan of care        Precautions: No strengthening until ROM return      Manuals            Scar massage 5'                                                   Neuro Re-Ed                                                                                                        Ther Ex             HEP: tendon gliding, MCP stretch, thumb abduction stretch, wrist PROM, prayer stretch Issued            Wrist stretching 5' 8'           Intrinsic stretches  5           Extrinsic stretches   5           Wrist maze  10x           Wall walking  TB 5x up/down, side-side           Supination/pronation with dowel  Hold green dowel 3x10           Keypegs  1x                        Ther Activity             Gripping  YPW 2x10                                                               Modalities             MHP 5' 8'

## 2021-04-28 ENCOUNTER — OFFICE VISIT (OUTPATIENT)
Dept: OCCUPATIONAL THERAPY | Facility: CLINIC | Age: 36
End: 2021-04-28
Payer: COMMERCIAL

## 2021-04-28 DIAGNOSIS — S52.602D TRAUMATIC CLOSED DISPLACED FRACTURE OF DISTAL END OF RADIUS AND ULNA, LEFT, WITH ROUTINE HEALING, SUBSEQUENT ENCOUNTER: Primary | ICD-10-CM

## 2021-04-28 DIAGNOSIS — S52.502D TRAUMATIC CLOSED DISPLACED FRACTURE OF DISTAL END OF RADIUS AND ULNA, LEFT, WITH ROUTINE HEALING, SUBSEQUENT ENCOUNTER: Primary | ICD-10-CM

## 2021-04-28 PROCEDURE — 97110 THERAPEUTIC EXERCISES: CPT | Performed by: OCCUPATIONAL THERAPIST

## 2021-04-28 PROCEDURE — 97530 THERAPEUTIC ACTIVITIES: CPT | Performed by: OCCUPATIONAL THERAPIST

## 2021-04-28 NOTE — PROGRESS NOTES
Daily Note     Today's date: 2021  Patient name: Maame Salinas  : 1985  MRN: [id number]  Referring provider: Brianna Mccauley MD  Dx:   Encounter Diagnosis     ICD-10-CM    1  Traumatic closed displaced fracture of distal end of radius and ulna, left, with routine healing, subsequent encounter  S52 502D     S52 602D                   Subjective: "It's OK"      Objective: See treatment diary below      Assessment: Still marked extrinsic stiffness but grossly improved AROM      Plan: Continue per plan of care        Precautions: No strengthening until ROM return      Manuals           Scar massage 5'                                                   Neuro Re-Ed                                                                                                        Ther Ex             HEP: tendon gliding, MCP stretch, thumb abduction stretch, wrist PROM, prayer stretch Issued            Wrist stretching 5 8          Intrinsic stretches  5 5          Extrinsic stretches   5 5          Wrist maze  10x 10x          Wall walking  TB 5x up/down, side-side 10x TB          Supination/pronation with dowel  Hold green dowel 3x10 3x10 S/P Greeen dowel          Keypegs  1x                        Ther Activity             Gripping  YPW 2x10 YPW 3x10                                                              Modalities             MHP 5 8'

## 2021-05-19 ENCOUNTER — OFFICE VISIT (OUTPATIENT)
Dept: OBGYN CLINIC | Facility: HOSPITAL | Age: 36
End: 2021-05-19

## 2021-05-19 VITALS
HEIGHT: 68 IN | WEIGHT: 203 LBS | HEART RATE: 68 BPM | BODY MASS INDEX: 30.77 KG/M2 | DIASTOLIC BLOOD PRESSURE: 88 MMHG | SYSTOLIC BLOOD PRESSURE: 125 MMHG

## 2021-05-19 DIAGNOSIS — Z98.890 S/P ORIF (OPEN REDUCTION INTERNAL FIXATION) FRACTURE: ICD-10-CM

## 2021-05-19 DIAGNOSIS — Z87.81 S/P ORIF (OPEN REDUCTION INTERNAL FIXATION) FRACTURE: ICD-10-CM

## 2021-05-19 DIAGNOSIS — Z47.89 AFTERCARE FOLLOWING SURGERY OF THE MUSCULOSKELETAL SYSTEM: Primary | ICD-10-CM

## 2021-05-19 PROCEDURE — 99024 POSTOP FOLLOW-UP VISIT: CPT | Performed by: PHYSICIAN ASSISTANT

## 2021-05-19 RX ORDER — MELOXICAM 15 MG/1
15 TABLET ORAL DAILY
Qty: 30 TABLET | Refills: 0 | Status: SHIPPED | OUTPATIENT
Start: 2021-05-19

## 2021-05-19 NOTE — PROGRESS NOTES
Assessment:   S/P REMOVAL HARDWARE left wrist spanning plate - Left on 8/14/4173    Plan:   Resume activities as tolerated and therapy  -   Discussed with patient she could continue with therapy to help improve range of motion capabilities  Discussed with patient taking an anti-inflammatory medication on a regular basis to help with overall inflammation control  -  Recommended meloxicam 15 mg daily times 2-4 weeks    Follow Up:  6  week(s)    To Do Next Visit:    re-evaluate postoperative recovery      CHIEF COMPLAINT:  Chief Complaint   Patient presents with    Left Wrist - Post-op     S/P Removal Hardware left wrist spaning plate DOS 0/56/53         SUBJECTIVE:  Maame Salinas is a 38 y o  female who presents for follow up after REMOVAL HARDWARE left wrist spanning plate - Left on 9/02/1295  Today patient has  Discomfort in her wrist with work activities  Patient states that she had has had improvement with her physical therapy activities  She has had improvement in her wrist motion, however, her drop does push her range of motion capabilities to the maximum, causing her some discomfort  She has not needed any over-the-counter pain medications  She denies any numbness or tingling  She denies any other acute complaints  PHYSICAL EXAMINATION:  Vital signs: /88   Pulse 68   Ht 5' 8" (1 727 m)   Wt 92 1 kg (203 lb)   BMI 30 87 kg/m²   General: well developed and well nourished, alert, oriented times 3 and appears comfortable  Psychiatric: Normal    MUSCULOSKELETAL EXAMINATION:  Incision: healed  There is palpable scar tissue at the incision site over the metacarpals,  Not limiting tendon mobility  Range of Motion:  Wrist flexion to 45°, extension to 30°, full pronation and supination    Patient is able to make a full composite fist   Neurovascular status: Neuro intact, good cap refill  Activity Restrictions: No restrictions         STUDIES REVIEWED:  No Studies to review      PROCEDURES PERFORMED:  Procedures  No Procedures performed today

## 2022-03-04 ENCOUNTER — OFFICE VISIT (OUTPATIENT)
Dept: DENTISTRY | Facility: CLINIC | Age: 37
End: 2022-03-04

## 2022-03-04 VITALS — TEMPERATURE: 98 F | SYSTOLIC BLOOD PRESSURE: 153 MMHG | DIASTOLIC BLOOD PRESSURE: 90 MMHG | HEART RATE: 74 BPM

## 2022-03-04 DIAGNOSIS — K02.9 CARIES: Primary | ICD-10-CM

## 2022-03-04 PROCEDURE — D0140 LIMITED ORAL EVALUATION - PROBLEM FOCUSED: HCPCS | Performed by: DENTIST

## 2022-03-04 PROCEDURE — D0220 INTRAORAL - PERIAPICAL FIRST RADIOGRAPHIC IMAGE: HCPCS | Performed by: DENTIST

## 2022-03-07 ENCOUNTER — OFFICE VISIT (OUTPATIENT)
Dept: DENTISTRY | Facility: CLINIC | Age: 37
End: 2022-03-07

## 2022-03-07 VITALS — DIASTOLIC BLOOD PRESSURE: 85 MMHG | TEMPERATURE: 96.8 F | HEART RATE: 77 BPM | SYSTOLIC BLOOD PRESSURE: 121 MMHG

## 2022-03-07 DIAGNOSIS — K02.9 CARIES: Primary | ICD-10-CM

## 2022-03-07 PROCEDURE — D7210 EXTRACTION, ERUPTED TOOTH REQUIRING REMOVAL OF BONE AND/OR SECTIONING OF TOOTH, AND INCLUDING ELEVATION OF MUCOPERIOSTEAL FLAP IF INDICATED: HCPCS | Performed by: DENTIST

## 2022-03-07 NOTE — PROGRESS NOTES
Pt presents to [facility] for limited exam with cc:" My tooth hurts on left side "    Pt said she has been having tooth pain in lower left area for weeks  She said that it is hard to schedule a sooner appointment to come in  She said that the pain is so intense, she called out from work  Med hx reviewed  Pt said that she is on birth control  No allergies     E/O: WNL    I/O: No fistula or swelling of gingival tissue  A: #18 broken down crown    P: Ext of tooth #18 and return for comp exam    Pt said that she is not scheduled for comp exam at this time  Recommended to pt to return  Explained to pt to go to emergency room immediately if she has any swelling that occurs after clinic hours  Call dental clinic if she has any problems, pain, or swelling during business hours      Done today: PA of tooth #18, limited exam of #18    NV1: Ext tooth #18  NV2: Comp exam    Resident: Dr Elysia Schwab  Attendign: Dr Lashay Wilson

## 2022-03-08 NOTE — PROGRESS NOTES
Oral Surgery    Blanca Kowalski presents for Ext #18    [facility], patient denies any changes  Obtained a direct and personal consent  Risks and complications were explained  Pt agreed and consented  Consent scanned in doc center  Pre-Op BP WNL  Administered 1 8 cc of 2 % Lidocaine w/ 1:100,000 epi via block/infiltration  1 8 cc of 3% Carbocaine w/o epi via YISSEL and lingual nerve block  ? Adequate anesthesia obtained  Sulcular incsions mad in area of #18 distal to #21 mesial  Periosteal elevator used to reflect full thickness apically positioned flap  Buccal bone removed with 8 round bur  703 fissure bur used to section tooth into mesial and distal portions  Small elevator used to luxate mesial and distal roots of tooth #18  Socket irrigated, and 4 0 chromic gut sutures placed  Upon dismissal, patient received POI, ice, gauze, no rx needed at this time  Pt instructed to alternate taking OTC Tylenol and Motrin  Pt left in stable condition      Done today: Ext tooth #18  NV: Comp exam and FMX scheduled for 5/9/22

## 2023-05-30 ENCOUNTER — HOSPITAL ENCOUNTER (EMERGENCY)
Facility: HOSPITAL | Age: 38
Discharge: HOME | End: 2023-05-30
Attending: EMERGENCY MEDICINE | Admitting: EMERGENCY MEDICINE

## 2023-05-30 VITALS
SYSTOLIC BLOOD PRESSURE: 134 MMHG | DIASTOLIC BLOOD PRESSURE: 83 MMHG | HEART RATE: 95 BPM | HEIGHT: 68 IN | OXYGEN SATURATION: 99 % | WEIGHT: 192 LBS | TEMPERATURE: 98 F | RESPIRATION RATE: 17 BRPM | BODY MASS INDEX: 29.1 KG/M2

## 2023-05-30 DIAGNOSIS — G43.909 MIGRAINE: Primary | ICD-10-CM

## 2023-05-30 RX ORDER — METOCLOPRAMIDE HYDROCHLORIDE 5 MG/ML
10 INJECTION INTRAMUSCULAR; INTRAVENOUS ONCE
Status: COMPLETED | OUTPATIENT
Start: 2023-05-30 | End: 2023-05-30

## 2023-05-30 RX ORDER — DEXAMETHASONE SODIUM PHOSPHATE 10 MG/ML
10 INJECTION, SOLUTION INTRAMUSCULAR; INTRAVENOUS ONCE
Status: COMPLETED | OUTPATIENT
Start: 2023-05-30 | End: 2023-05-30

## 2023-05-30 RX ORDER — KETOROLAC TROMETHAMINE 30 MG/ML
30 INJECTION, SOLUTION INTRAMUSCULAR; INTRAVENOUS ONCE
Status: COMPLETED | OUTPATIENT
Start: 2023-05-30 | End: 2023-05-30

## 2023-05-30 RX ORDER — DIPHENHYDRAMINE HYDROCHLORIDE 50 MG/ML
25 INJECTION INTRAMUSCULAR; INTRAVENOUS ONCE
Status: COMPLETED | OUTPATIENT
Start: 2023-05-30 | End: 2023-05-30

## 2023-05-30 RX ADMIN — DEXAMETHASONE SODIUM PHOSPHATE 10 MG: 10 INJECTION, SOLUTION INTRAMUSCULAR; INTRAVENOUS at 17:25

## 2023-05-30 RX ADMIN — KETOROLAC TROMETHAMINE 30 MG: 30 INJECTION, SOLUTION INTRAMUSCULAR at 17:24

## 2023-05-30 RX ADMIN — METOCLOPRAMIDE HYDROCHLORIDE 10 MG: 5 INJECTION INTRAMUSCULAR; INTRAVENOUS at 16:29

## 2023-05-30 RX ADMIN — DIPHENHYDRAMINE HYDROCHLORIDE 25 MG: 50 INJECTION, SOLUTION INTRAMUSCULAR; INTRAVENOUS at 16:29

## 2023-05-30 RX ADMIN — SODIUM CHLORIDE 1000 ML: 0.9 INJECTION, SOLUTION INTRAVENOUS at 16:26

## 2023-05-30 RX ADMIN — SODIUM CHLORIDE 1000 ML: 0.9 INJECTION, SOLUTION INTRAVENOUS at 17:23

## 2023-05-31 NOTE — ED PROVIDER NOTES
History  Chief Complaint   Patient presents with   • Migraine     Pt c/o migraine for a week as of yesterday pt c/o nausea, dizziness has hx migraines     This is a 79-year-old female who presents to the emergency department complaining of a headache  The patient states this headache feels like her normal migraines  She states it started approximately 1 week ago  She states it is lasting longer slightly longer than her normal migraine  She has seen neurology for migraines and is on Topamax daily for it  She denies fevers or chills  She denies vomiting but has had some nausea  She denies chest pain or trouble breathing  She states the headache came on gradually and has gotten progressively worse over time  She states that she has had similar headaches in the past           Prior to Admission Medications   Prescriptions Last Dose Informant Patient Reported? Taking?    SUMAtriptan (IMITREX) 50 mg tablet   Yes No   Sig: Take 50 mg by mouth 2 (two) times a day as needed   acetaminophen (TYLENOL) 650 mg CR tablet   No No   Sig: Take 1 tablet (650 mg total) by mouth every 8 (eight) hours as needed for mild pain   aspirin-acetaminophen-caffeine (EXCEDRIN MIGRAINE) 250-250-65 MG per tablet   Yes No   Sig: Take 1 tablet by mouth every 6 (six) hours as needed for headaches   escitalopram (LEXAPRO) 10 mg tablet   Yes No   Sig: Take 10 mg by mouth as needed Takes at bedtime   meloxicam (MOBIC) 15 mg tablet   No No   Sig: Take 1 tablet (15 mg total) by mouth daily   naproxen sodium (ALEVE) 220 MG tablet   No No   Sig: Take 1 tablet (220 mg total) by mouth 2 (two) times a day with meals   topiramate (TOPAMAX) 50 MG tablet   Yes No   Sig: Take 50 mg by mouth as needed       Facility-Administered Medications: None       Past Medical History:   Diagnosis Date   • Anxiety    • Asthma    • Depression    • Hyperlipidemia    • Hypertension    • Migraine        Past Surgical History:   Procedure Laterality Date   • CAST APPLICATION Left 48/51/0543    Procedure: Application short-arm splint left arm;  Surgeon: Aniya Barrow MD;  Location: [facility];  Service: Orthopedics   • OH OPEN [address] RADIAL&ULNAR SHAFT FX W/FIXJ RADIUS&ULNA Left 12/15/2020    Procedure: Open reduction and internal fixation left-sided 3+ part distal radius [id number];  Surgeon: Aniya Barrow MD;  Location: [facility];  Service: Orthopedics   • TUBAL LIGATION     • WRIST HARDWARE REMOVAL Left 3/23/2021    Procedure: REMOVAL HARDWARE left wrist spanning plate;  Surgeon: Aniya Barrow MD;  Location: [facility];  Service: Orthopedics       History reviewed  No pertinent family history  I have reviewed and agree with the history as documented  E-Cigarette/Vaping   • E-Cigarette Use Never User      E-Cigarette/Vaping Substances   • Nicotine No    • THC No    • CBD No    • Flavoring No    • Other No    • Unknown No      Social History     Tobacco Use   • Smoking status: Every Day     Packs/day: 0 50     Types: Cigarettes   • Smokeless tobacco: Never   Vaping Use   • Vaping Use: Never used   Substance Use Topics   • Alcohol use: Not Currently   • Drug use: Never       Review of Systems   All other systems reviewed and are negative        Physical Exam  Physical Exam  Constitutional:  Vital signs reviewed, patient appears nontoxic, no acute distress  Eyes: Pupils equal round reactive to light and accommodation, extraocular muscles intact  HEENT: trachea midline, no JVD, moist mucous membranes  Respiratory: lung sounds clear throughout, no rhonchi, no rales  Cardiovascular: regular rate rhythm, no murmurs or rubs  Abdomen: soft, nontender, nondistended, no rebound or guarding  Back: no CVA tenderness, normal inspection  Extremities: no edema, pulses equal in all 4 extremities  Neuro: awake, alert, oriented, no focal weakness  Skin: warm, dry, intact, no rashes noted    Vital Signs  ED Triage Vitals [05/30/23 1606]   Temperature Pulse Respirations Blood Pressure SpO2   98 °F (36 7 °C) 95 17 134/83 99 %      Temp Source Heart Rate Source Patient Position - Orthostatic VS BP Location FiO2 (%)   Oral Monitor Sitting Left arm --      Pain Score       7           Vitals:    05/30/23 1606   BP: 134/83   Pulse: 95   Patient Position - Orthostatic VS: Sitting         Visual Acuity      ED Medications  Medications   sodium chloride 0 9 % bolus 1,000 mL (0 mL Intravenous Stopped 5/30/23 1722)   metoclopramide (REGLAN) injection 10 mg (10 mg Intravenous Given 5/30/23 1629)   diphenhydrAMINE (BENADRYL) injection 25 mg (25 mg Intravenous Given 5/30/23 1629)   ketorolac (TORADOL) injection 30 mg (30 mg Intravenous Given 5/30/23 1724)   dexamethasone (PF) (DECADRON) injection 10 mg (10 mg Intravenous Given 5/30/23 1725)   sodium chloride 0 9 % bolus 1,000 mL (0 mL Intravenous Stopped 5/30/23 1815)       Diagnostic Studies  Results Reviewed     None                 No orders to display              Procedures  Procedures         ED Course  ED Course as of 05/31/23 1434   Tue May 30, 2023   1722 I reevaluated the patient  She continues to have the same headache  I will order additional fluids, toradol, and steroids  1810 The patient was reevaluated again  She states that she has complete resolution of her symptoms following medications  The patient is requesting to be discharged  She has good follow-up with her primary care physician and will be discharged                               SBIRT 20yo+    Flowsheet Row Most Recent Value   Initial Alcohol Screen: US AUDIT-C     1  How often do you have a drink containing alcohol? 0 Filed at: 05/30/2023 1609   2  How many drinks containing alcohol do you have on a typical day you are drinking? 0 Filed at: 05/30/2023 1609   3a  Male UNDER 65: How often do you have five or more drinks on one occasion? 0 Filed at: 05/30/2023 1609   3b  FEMALE Any Age, or MALE 65+:  How often do you have 4 or more drinks on one occassion? 0 Filed at: 05/30/2023 1609   Audit-C Score 0 Filed at: 05/30/2023 1609   NALDO: How many times in the past year have you    Used an illegal drug or used a prescription medication for non-medical reasons? Never Filed at: 05/30/2023 2005  [address]  This is a 26-year-old female presents to the emergency department complaining of headache  I considered acute headache, migraine, dehydration  These and other diagnoses were considered  Migraine: acute illness or injury  Risk  Prescription drug management  Disposition  Final diagnoses:   Migraine     Time reflects when diagnosis was documented in both MDM as applicable and the Disposition within this note     Time User Action Codes Description Comment    5/30/2023  6:10 PM Kalen Mora Add [I44 112] Migraine       ED Disposition     ED Disposition   Discharge    Condition   Stable    Date/Time   Tue May 30, 2023  6:10 PM    Jose A discharge to home/self care                 Follow-up Information     Follow up With Specialties Details Why Contact Info Additional Information    Florencio Bose PA-C Physician Assistant In 2 days  [address]  [phone] [address] Emergency Department Emergency Medicine  If symptoms worsen [address] [facility],[address] [phone]  [facility] Emergency Department, [address], [address]          Discharge Medication List as of 5/30/2023  6:11 PM      CONTINUE these medications which have NOT CHANGED    Details   acetaminophen (TYLENOL) 650 mg CR tablet Take 1 tablet (650 mg total) by mouth every 8 (eight) hours as needed for mild pain, Starting Tue 3/23/2021, Normal      aspirin-acetaminophen-caffeine (EXCEDRIN MIGRAINE) 250-250-65 MG per tablet Take 1 tablet by mouth every 6 (six) hours as needed for headaches, Historical Med      escitalopram (LEXAPRO) 10 mg tablet Take 10 mg by mouth as needed Takes at bedtime, Starting Tue 7/28/2020, Historical Med      meloxicam (MOBIC) 15 mg tablet Take 1 tablet (15 mg total) by mouth daily, Starting Wed 5/19/2021, Normal      naproxen sodium (ALEVE) 220 MG tablet Take 1 tablet (220 mg total) by mouth 2 (two) times a day with meals, Starting Tue 3/23/2021, Normal      SUMAtriptan (IMITREX) 50 mg tablet Take 50 mg by mouth 2 (two) times a day as needed, Starting Tue 7/28/2020, Until Wed 7/28/2021, Historical Med      topiramate (TOPAMAX) 50 MG tablet Take 50 mg by mouth as needed , Starting Tue 6/16/2020, Until Wed 6/16/2021, Historical Med             No discharge procedures on file      PDMP Review       Value Time User    PDMP Reviewed  Yes 3/23/2021 10:15 AM Vj Reid PA-C          ED Provider  Electronically Signed by           Stefan Zimmer DO  05/31/23 4688

## 2024-01-16 ENCOUNTER — HOSPITAL ENCOUNTER (EMERGENCY)
Facility: HOSPITAL | Age: 39
Discharge: HOME | End: 2024-01-16
Attending: INTERNAL MEDICINE | Admitting: INTERNAL MEDICINE
Payer: COMMERCIAL

## 2024-01-16 VITALS
DIASTOLIC BLOOD PRESSURE: 81 MMHG | TEMPERATURE: 98.6 F | SYSTOLIC BLOOD PRESSURE: 115 MMHG | HEART RATE: 88 BPM | OXYGEN SATURATION: 97 % | BODY MASS INDEX: 29.25 KG/M2 | RESPIRATION RATE: 15 BRPM | HEIGHT: 68 IN | WEIGHT: 193 LBS

## 2024-01-16 DIAGNOSIS — G43.809 OTHER MIGRAINE WITHOUT STATUS MIGRAINOSUS, NOT INTRACTABLE: ICD-10-CM

## 2024-01-16 DIAGNOSIS — R42 LIGHTHEADEDNESS: Primary | ICD-10-CM

## 2024-01-16 LAB
ALBUMIN SERPL BCP-MCNC: 4.2 G/DL (ref 3.5–5)
ALP SERPL-CCNC: 62 U/L (ref 34–104)
ALT SERPL W P-5'-P-CCNC: 9 U/L (ref 7–52)
ANION GAP SERPL CALCULATED.3IONS-SCNC: 8 MMOL/L
AST SERPL W P-5'-P-CCNC: 9 U/L (ref 13–39)
BASOPHILS # BLD AUTO: 0.06 THOUSANDS/ÂΜL (ref 0–0.1)
BASOPHILS NFR BLD AUTO: 1 % (ref 0–1)
BILIRUB SERPL-MCNC: 0.43 MG/DL (ref 0.2–1)
BUN SERPL-MCNC: 6 MG/DL (ref 5–25)
CALCIUM SERPL-MCNC: 9.4 MG/DL (ref 8.4–10.2)
CARDIAC TROPONIN I PNL SERPL HS: <2 NG/L (ref 8–18)
CHLORIDE SERPL-SCNC: 108 MMOL/L (ref 96–108)
CO2 SERPL-SCNC: 22 MMOL/L (ref 21–32)
CREAT SERPL-MCNC: 0.9 MG/DL (ref 0.6–1.3)
EOSINOPHIL # BLD AUTO: 0.48 THOUSAND/ÂΜL (ref 0–0.61)
EOSINOPHIL NFR BLD AUTO: 4 % (ref 0–6)
ERYTHROCYTE [DISTWIDTH] IN BLOOD BY AUTOMATED COUNT: 13.4 % (ref 11.6–15.1)
GFR SERPL CREATININE-BSD FRML MDRD: 80 ML/MIN/1.73SQ M
GLUCOSE SERPL-MCNC: 85 MG/DL (ref 65–140)
HCT VFR BLD AUTO: 38.6 % (ref 34.8–46.1)
HGB BLD-MCNC: 12.4 G/DL (ref 11.5–15.4)
IMM GRANULOCYTES # BLD AUTO: 0.02 THOUSAND/UL (ref 0–0.2)
IMM GRANULOCYTES NFR BLD AUTO: 0 % (ref 0–2)
LYMPHOCYTES # BLD AUTO: 3.47 THOUSANDS/ÂΜL (ref 0.6–4.47)
LYMPHOCYTES NFR BLD AUTO: 32 % (ref 14–44)
MCH RBC QN AUTO: 27.4 PG (ref 26.8–34.3)
MCHC RBC AUTO-ENTMCNC: 32.1 G/DL (ref 31.4–37.4)
MCV RBC AUTO: 85 FL (ref 82–98)
MONOCYTES # BLD AUTO: 0.62 THOUSAND/ÂΜL (ref 0.17–1.22)
MONOCYTES NFR BLD AUTO: 6 % (ref 4–12)
NEUTROPHILS # BLD AUTO: 6.26 THOUSANDS/ÂΜL (ref 1.85–7.62)
NEUTS SEG NFR BLD AUTO: 57 % (ref 43–75)
NRBC BLD AUTO-RTO: 0 /100 WBCS
PLATELET # BLD AUTO: 283 THOUSANDS/UL (ref 149–390)
PMV BLD AUTO: 11.1 FL (ref 8.9–12.7)
POTASSIUM SERPL-SCNC: 3.6 MMOL/L (ref 3.5–5.3)
PROT SERPL-MCNC: 7.4 G/DL (ref 6.4–8.4)
RBC # BLD AUTO: 4.53 MILLION/UL (ref 3.81–5.12)
SODIUM SERPL-SCNC: 138 MMOL/L (ref 135–147)
WBC # BLD AUTO: 10.91 THOUSAND/UL (ref 4.31–10.16)

## 2024-01-16 PROCEDURE — 80053 COMPREHEN METABOLIC PANEL: CPT | Performed by: INTERNAL MEDICINE

## 2024-01-16 PROCEDURE — 36415 COLL VENOUS BLD VENIPUNCTURE: CPT | Performed by: INTERNAL MEDICINE

## 2024-01-16 PROCEDURE — 85025 COMPLETE CBC W/AUTO DIFF WBC: CPT | Performed by: INTERNAL MEDICINE

## 2024-01-16 PROCEDURE — 99284 EMERGENCY DEPT VISIT MOD MDM: CPT | Performed by: INTERNAL MEDICINE

## 2024-01-16 PROCEDURE — 96375 TX/PRO/DX INJ NEW DRUG ADDON: CPT

## 2024-01-16 PROCEDURE — 93005 ELECTROCARDIOGRAM TRACING: CPT

## 2024-01-16 PROCEDURE — 96361 HYDRATE IV INFUSION ADD-ON: CPT

## 2024-01-16 PROCEDURE — 84484 ASSAY OF TROPONIN QUANT: CPT | Performed by: INTERNAL MEDICINE

## 2024-01-16 PROCEDURE — 99284 EMERGENCY DEPT VISIT MOD MDM: CPT

## 2024-01-16 PROCEDURE — 96374 THER/PROPH/DIAG INJ IV PUSH: CPT

## 2024-01-16 RX ORDER — DIPHENHYDRAMINE HYDROCHLORIDE 50 MG/ML
25 INJECTION INTRAMUSCULAR; INTRAVENOUS ONCE
Status: COMPLETED | OUTPATIENT
Start: 2024-01-16 | End: 2024-01-16

## 2024-01-16 RX ORDER — KETOROLAC TROMETHAMINE 30 MG/ML
15 INJECTION, SOLUTION INTRAMUSCULAR; INTRAVENOUS ONCE
Status: COMPLETED | OUTPATIENT
Start: 2024-01-16 | End: 2024-01-16

## 2024-01-16 RX ORDER — METOCLOPRAMIDE HYDROCHLORIDE 5 MG/ML
10 INJECTION INTRAMUSCULAR; INTRAVENOUS ONCE
Status: COMPLETED | OUTPATIENT
Start: 2024-01-16 | End: 2024-01-16

## 2024-01-16 RX ADMIN — SODIUM CHLORIDE 1000 ML: 0.9 INJECTION, SOLUTION INTRAVENOUS at 11:50

## 2024-01-16 RX ADMIN — DIPHENHYDRAMINE HYDROCHLORIDE 25 MG: 50 INJECTION, SOLUTION INTRAMUSCULAR; INTRAVENOUS at 11:51

## 2024-01-16 RX ADMIN — KETOROLAC TROMETHAMINE 15 MG: 30 INJECTION, SOLUTION INTRAMUSCULAR; INTRAVENOUS at 11:51

## 2024-01-16 RX ADMIN — METOCLOPRAMIDE 10 MG: 5 INJECTION, SOLUTION INTRAMUSCULAR; INTRAVENOUS at 11:45

## 2024-01-16 NOTE — ED PROVIDER NOTES
History  Chief Complaint   Patient presents with    Dizziness     Pt reports feeling intermittently lightheaded for a few days. Pt states she has also been having headaches, with hx of migraines. States the headaches feel similar to her migraines      This is a 38 years old came for having headache for few days.  Patient stated she has headache on the right side of the head.  Patient has history of migraine headache and she is on Topamax 50 mg twice daily.  Patient was advised to stop it as she was going to have endometrial biopsy for having vaginal bleeding.  Patient has no fever, neck pain, neck stiffness.  Patient has history of asthma and depression and hypertension.  Patient denies any blurring of vision.  Patient denies light bothering her eye or noise.  Patient stated that it is not the worst headache of her life.  Patient sitting in no distress.  Patient stated that she feels lightheaded and she was dizzy.  Patient describes dizziness as lightheadedness.        Prior to Admission Medications   Prescriptions Last Dose Informant Patient Reported? Taking?   SUMAtriptan (IMITREX) 50 mg tablet   Yes No   Sig: Take 50 mg by mouth 2 (two) times a day as needed   acetaminophen (TYLENOL) 650 mg CR tablet   No No   Sig: Take 1 tablet (650 mg total) by mouth every 8 (eight) hours as needed for mild pain   aspirin-acetaminophen-caffeine (EXCEDRIN MIGRAINE) 250-250-65 MG per tablet   Yes No   Sig: Take 1 tablet by mouth every 6 (six) hours as needed for headaches   escitalopram (LEXAPRO) 10 mg tablet   Yes No   Sig: Take 10 mg by mouth as needed Takes at bedtime   meloxicam (MOBIC) 15 mg tablet   No No   Sig: Take 1 tablet (15 mg total) by mouth daily   naproxen sodium (ALEVE) 220 MG tablet   No No   Sig: Take 1 tablet (220 mg total) by mouth 2 (two) times a day with meals   topiramate (TOPAMAX) 50 MG tablet   Yes No   Sig: Take 50 mg by mouth as needed       Facility-Administered Medications: None       Past Medical  History:   Diagnosis Date    Anxiety     Asthma     Depression     Hyperlipidemia     Hypertension     Migraine        Past Surgical History:   Procedure Laterality Date    CAST APPLICATION Left 12/15/2020    Procedure: Application short-arm splint left arm;  Surgeon: Quang Perez MD;  Location: [facility];  Service: Orthopedics    WY OPEN TX RADIAL&ULNAR SHAFT FX W/FIXJ RADIUS&ULNA Left 12/15/2020    Procedure: Open reduction and internal fixation left-sided 3+ part distal radius [id number];  Surgeon: Quang Perez MD;  Location: [facility];  Service: Orthopedics    TUBAL LIGATION      WRIST HARDWARE REMOVAL Left 3/23/2021    Procedure: REMOVAL HARDWARE left wrist spanning plate;  Surgeon: Quang Perez MD;  Location: [facility];  Service: Orthopedics       History reviewed. No pertinent family history.  I have reviewed and agree with the history as documented.    E-Cigarette/Vaping    E-Cigarette Use Never User      E-Cigarette/Vaping Substances    Nicotine No     THC No     CBD No     Flavoring No     Other No     Unknown No      Social History     Tobacco Use    Smoking status: Every Day     Current packs/day: 0.50     Types: Cigarettes    Smokeless tobacco: Never   Vaping Use    Vaping status: Never Used   Substance Use Topics    Alcohol use: Not Currently    Drug use: Never       Review of Systems   Constitutional:  Negative for fatigue and fever.   HENT:  Negative for congestion, sinus pressure, sinus pain, sneezing, sore throat and tinnitus.    Respiratory:  Negative for cough and shortness of breath.    Cardiovascular:  Negative for chest pain and palpitations.   Gastrointestinal:  Negative for abdominal pain, diarrhea, nausea and vomiting.   Genitourinary:  Negative for difficulty urinating, dysuria, flank pain and frequency.   Musculoskeletal:  Negative for back pain, myalgias, neck pain and neck stiffness.   Skin:  Negative for color change, pallor and rash.   Neurological:   Positive for dizziness and headaches. Negative for tremors, seizures, syncope, speech difficulty, weakness, light-headedness and numbness.   Hematological:  Negative for adenopathy. Does not bruise/bleed easily.   Psychiatric/Behavioral:  Negative for agitation and behavioral problems.        Physical Exam  Physical Exam  Vitals and nursing note reviewed.   Constitutional:       General: She is not in acute distress.     Appearance: She is well-developed. She is not ill-appearing, toxic-appearing or diaphoretic.   HENT:      Head: Normocephalic and atraumatic.      Right Ear: Ear canal normal.      Left Ear: Ear canal normal.      Nose: Nose normal. No congestion or rhinorrhea.      Mouth/Throat:      Pharynx: No oropharyngeal exudate or posterior oropharyngeal erythema.   Eyes:      General: No scleral icterus.        Right eye: No discharge.         Left eye: No discharge.      Extraocular Movements: Extraocular movements intact.      Pupils: Pupils are equal, round, and reactive to light.   Neck:      Vascular: No carotid bruit.   Cardiovascular:      Rate and Rhythm: Normal rate and regular rhythm.      Heart sounds: Normal heart sounds. No murmur heard.     No friction rub. No gallop.   Pulmonary:      Effort: Pulmonary effort is normal. No respiratory distress.      Breath sounds: Normal breath sounds. No stridor. No wheezing, rhonchi or rales.   Chest:      Chest wall: No tenderness.   Abdominal:      General: Bowel sounds are normal. There is no distension.      Palpations: Abdomen is soft. There is no mass.      Tenderness: There is no abdominal tenderness. There is no right CVA tenderness, left CVA tenderness, guarding or rebound.      Hernia: No hernia is present.   Musculoskeletal:         General: No swelling, tenderness, deformity or signs of injury. Normal range of motion.      Cervical back: Normal range of motion and neck supple. No rigidity or tenderness.      Right lower leg: No edema.      Left  lower leg: No edema.   Lymphadenopathy:      Cervical: No cervical adenopathy.   Skin:     General: Skin is warm and dry.      Capillary Refill: Capillary refill takes less than 2 seconds.      Coloration: Skin is not jaundiced or pale.      Findings: No bruising, erythema, lesion or rash.   Neurological:      General: No focal deficit present.      Mental Status: She is alert and oriented to person, place, and time.      Cranial Nerves: No cranial nerve deficit.      Sensory: No sensory deficit.      Motor: No weakness.      Coordination: Coordination normal.      Gait: Gait normal.      Deep Tendon Reflexes: Reflexes normal.   Psychiatric:         Mood and Affect: Mood normal.         Behavior: Behavior normal.         Vital Signs  ED Triage Vitals [01/16/24 1120]   Temperature Pulse Respirations Blood Pressure SpO2   98.6 °F (37 °C) 88 15 115/81 97 %      Temp Source Heart Rate Source Patient Position - Orthostatic VS BP Location FiO2 (%)   Oral Monitor Sitting Left arm --      Pain Score       4           Vitals:    01/16/24 1120   BP: 115/81   Pulse: 88   Patient Position - Orthostatic VS: Sitting         Visual Acuity      ED Medications  Medications   sodium chloride 0.9 % bolus 1,000 mL (0 mL Intravenous Stopped 1/16/24 1326)   ketorolac (TORADOL) injection 15 mg (15 mg Intravenous Given 1/16/24 1151)   diphenhydrAMINE (BENADRYL) injection 25 mg (25 mg Intravenous Given 1/16/24 1151)   metoclopramide (REGLAN) injection 10 mg (10 mg Intravenous Given 1/16/24 1145)       Diagnostic Studies  Results Reviewed       Procedure Component Value Units Date/Time    High Sensitivity Troponin I Random [[id number]]  (Abnormal) Collected: 01/16/24 1139    Lab Status: Final result Specimen: Blood from Arm, Right Updated: 01/16/24 1209     HS TnI random <2 ng/L     Comprehensive metabolic panel [[id number]]  (Abnormal) Collected: 01/16/24 1139    Lab Status: Final result Specimen: Blood from Arm, Right Updated: 01/16/24 1205      Sodium 138 mmol/L      Potassium 3.6 mmol/L      Chloride 108 mmol/L      CO2 22 mmol/L      ANION GAP 8 mmol/L      BUN 6 mg/dL      Creatinine 0.90 mg/dL      Glucose 85 mg/dL      Calcium 9.4 mg/dL      AST 9 U/L      ALT 9 U/L      Alkaline Phosphatase 62 U/L      Total Protein 7.4 g/dL      Albumin 4.2 g/dL      Total Bilirubin 0.43 mg/dL      eGFR 80 ml/min/1.73sq m     Narrative:      National Kidney Disease Foundation guidelines for Chronic Kidney Disease (CKD):     Stage 1 with normal or high GFR (GFR > 90 mL/min/1.73 square meters)    Stage 2 Mild CKD (GFR = 60-89 mL/min/1.73 square meters)    Stage 3A Moderate CKD (GFR = 45-59 mL/min/1.73 square meters)    Stage 3B Moderate CKD (GFR = 30-44 mL/min/1.73 square meters)    Stage 4 Severe CKD (GFR = 15-29 mL/min/1.73 square meters)    Stage 5 End Stage CKD (GFR <15 mL/min/1.73 square meters)  Note: GFR calculation is accurate only with a steady state creatinine    CBC and differential [[id number]]  (Abnormal) Collected: 01/16/24 1139    Lab Status: Final result Specimen: Blood from Arm, Right Updated: 01/16/24 1147     WBC 10.91 Thousand/uL      RBC 4.53 Million/uL      Hemoglobin 12.4 g/dL      Hematocrit 38.6 %      MCV 85 fL      MCH 27.4 pg      MCHC 32.1 g/dL      RDW 13.4 %      MPV 11.1 fL      Platelets 283 Thousands/uL      nRBC 0 /100 WBCs      Neutrophils Relative 57 %      Immat GRANS % 0 %      Lymphocytes Relative 32 %      Monocytes Relative 6 %      Eosinophils Relative 4 %      Basophils Relative 1 %      Neutrophils Absolute 6.26 Thousands/µL      Immature Grans Absolute 0.02 Thousand/uL      Lymphocytes Absolute 3.47 Thousands/µL      Monocytes Absolute 0.62 Thousand/µL      Eosinophils Absolute 0.48 Thousand/µL      Basophils Absolute 0.06 Thousands/µL                    No orders to display              Procedures  ECG 12 Lead Documentation Only    Date/Time: 1/16/2024 1:14 PM    Performed by: Isa Mejia MD  Authorized by:  Isa Mejia MD    Indications / Diagnosis:  Dizziness  ECG reviewed by me, the ED Provider: yes    Patient location:  ED and bedside  Previous ECG:     Previous ECG:  Unavailable    Comparison to cardiac monitor: Yes    Interpretation:     Interpretation: normal    Rate:     ECG rate:  74    ECG rate assessment: normal    Rhythm:     Rhythm: sinus rhythm    Ectopy:     Ectopy: none    QRS:     QRS axis:  Normal    QRS intervals:  Normal  Conduction:     Conduction: normal    ST segments:     ST segments:  Normal  T waves:     T waves: normal    Comments:      QT/Qtc 354/392           ED Course                               SBIRT 20yo+      Flowsheet Row Most Recent Value   Initial Alcohol Screen: US AUDIT-C     1. How often do you have a drink containing alcohol? 0 Filed at: 01/16/2024 1120   2. How many drinks containing alcohol do you have on a typical day you are drinking?  0 Filed at: 01/16/2024 1120   3a. Male UNDER 65: How often do you have five or more drinks on one occasion? 0 Filed at: 01/16/2024 1120   3b. FEMALE Any Age, or MALE 65+: How often do you have 4 or more drinks on one occassion? 0 Filed at: 01/16/2024 1120   Audit-C Score 0 Filed at: 01/16/2024 1120   NALDO: How many times in the past year have you...    Used an illegal drug or used a prescription medication for non-medical reasons? Never Filed at: 01/16/2024 1120                      Medical Decision Making  This is a 38 years old came for having lightheadedness and headache on the right side of the head.  Patient has long history of migraine headache and she was on Topamax.  Patient started as she was told that she is going to go for endometrial biopsy.  Patient has no nausea no vomiting, blurred vision.  Patient has no fever, neck pain, neck stiffness.  Patient stated that this not the worst headache of her life.  Physical exam shows no pertinent positive findings.  Neurological exam shows no focal deficit and it is intact.  Labs  reviewed came back within normal limits.  EKG is normal.  Patient felt better with treatment as she received IV fluids, Reglan, Benadryl, Toradol.  At this time organ to discharge patient home and follow-up with her pmd. Reviewed the patient labs including CBC, CMP and troponin came back within normal limits.  Pt is feeling better and would like to go home. I rechecked the pt and undated on the results of the ED findings, including physical exam,diagnosis, and all abnormal test results. I discussed the plan of discharge with the pt and advised to follow up with PCP or to return to the ED for any new or worsening symptoms.pt understands and agrees with the plan of care. All questions and concerns have been addressed at this time.      Amount and/or Complexity of Data Reviewed  Labs: ordered.     Details: CBC, CMP are within normal limits.  Troponin<2  ECG/medicine tests: ordered and independent interpretation performed.     Details: EKG shows NSR with 74 no ischemic changes normal EKG.    Risk  Prescription drug management.             Disposition  Final diagnoses:   Lightheadedness   Other migraine without status migrainosus, not intractable     Time reflects when diagnosis was documented in both MDM as applicable and the Disposition within this note       Time User Action Codes Description Comment    1/16/2024  1:20 PM Isa Mejia Add [R42] Lightheadedness     1/16/2024  1:20 PM Isa Mejia Add [G43.809] Other migraine without status migrainosus, not intractable           ED Disposition       ED Disposition   Discharge    Condition   Stable    Date/Time   Tue Jan 16, 2024 1320    Comment   Mariel Ernandez discharge to home/self care.                   Follow-up Information       Follow up With Specialties Details Why Contact Info    Mikayla Bach MD Neurology  As needed [address]  [place] [address]  [phone]              Discharge Medication List as of 1/16/2024  1:22 PM        CONTINUE these  medications which have NOT CHANGED    Details   acetaminophen (TYLENOL) 650 mg CR tablet Take 1 tablet (650 mg total) by mouth every 8 (eight) hours as needed for mild pain, Starting Tue 3/23/2021, Normal      aspirin-acetaminophen-caffeine (EXCEDRIN MIGRAINE) 250-250-65 MG per tablet Take 1 tablet by mouth every 6 (six) hours as needed for headaches, Historical Med      escitalopram (LEXAPRO) 10 mg tablet Take 10 mg by mouth as needed Takes at bedtime, Starting Tue 7/28/2020, Historical Med      meloxicam (MOBIC) 15 mg tablet Take 1 tablet (15 mg total) by mouth daily, Starting Wed 5/19/2021, Normal      naproxen sodium (ALEVE) 220 MG tablet Take 1 tablet (220 mg total) by mouth 2 (two) times a day with meals, Starting Tue 3/23/2021, Normal      SUMAtriptan (IMITREX) 50 mg tablet Take 50 mg by mouth 2 (two) times a day as needed, Starting Tue 7/28/2020, Until Wed 7/28/2021, Historical Med      topiramate (TOPAMAX) 50 MG tablet Take 50 mg by mouth as needed , Starting Tue 6/16/2020, Until Wed 6/16/2021, Historical Med             No discharge procedures on file.    PDMP Review         Value Time User    PDMP Reviewed  Yes 3/23/2021 10:15 AM Jose Bagley PA-C            ED Provider  Electronically Signed by             Isa Mejia MD  01/16/24 2016

## 2024-01-16 NOTE — Clinical Note
Mariel Ernandez was seen and treated in our emergency department on 1/16/2024.                Diagnosis:     Mariel  may return to work on return date.    She may return on this date: 01/18/2024         If you have any questions or concerns, please don't hesitate to call.      Isa Mejia MD    ______________________________           _______________          _______________  Hospital Representative                              Date                                Time

## 2024-01-16 NOTE — DISCHARGE INSTRUCTIONS
Take medications as prescribed  Follow up with neurology dr Bach.  Follow up with your PMD.  Labs Reviewed   CBC AND DIFFERENTIAL - Abnormal       Result Value Ref Range Status    WBC 10.91 (*) 4.31 - 10.16 Thousand/uL Final    RBC 4.53  3.81 - 5.12 Million/uL Final    Hemoglobin 12.4  11.5 - 15.4 g/dL Final    Hematocrit 38.6  34.8 - 46.1 % Final    MCV 85  82 - 98 fL Final    MCH 27.4  26.8 - 34.3 pg Final    MCHC 32.1  31.4 - 37.4 g/dL Final    RDW 13.4  11.6 - 15.1 % Final    MPV 11.1  8.9 - 12.7 fL Final    Platelets 283  149 - 390 Thousands/uL Final    nRBC 0  /100 WBCs Final    Neutrophils Relative 57  43 - 75 % Final    Immat GRANS % 0  0 - 2 % Final    Lymphocytes Relative 32  14 - 44 % Final    Monocytes Relative 6  4 - 12 % Final    Eosinophils Relative 4  0 - 6 % Final    Basophils Relative 1  0 - 1 % Final    Neutrophils Absolute 6.26  1.85 - 7.62 Thousands/µL Final    Immature Grans Absolute 0.02  0.00 - 0.20 Thousand/uL Final    Lymphocytes Absolute 3.47  0.60 - 4.47 Thousands/µL Final    Monocytes Absolute 0.62  0.17 - 1.22 Thousand/µL Final    Eosinophils Absolute 0.48  0.00 - 0.61 Thousand/µL Final    Basophils Absolute 0.06  0.00 - 0.10 Thousands/µL Final   COMPREHENSIVE METABOLIC PANEL - Abnormal    Sodium 138  135 - 147 mmol/L Final    Potassium 3.6  3.5 - 5.3 mmol/L Final    Chloride 108  96 - 108 mmol/L Final    CO2 22  21 - 32 mmol/L Final    ANION GAP 8  mmol/L Final    BUN 6  5 - 25 mg/dL Final    Creatinine 0.90  0.60 - 1.30 mg/dL Final    Comment: Standardized to IDMS reference method    Glucose 85  65 - 140 mg/dL Final    Comment: If the patient is fasting, the ADA then defines impaired fasting glucose as > 100 mg/dL and diabetes as > or equal to 123 mg/dL.    Calcium 9.4  8.4 - 10.2 mg/dL Final    AST 9 (*) 13 - 39 U/L Final    ALT 9  7 - 52 U/L Final    Comment: Specimen collection should occur prior to Sulfasalazine administration due to the potential for falsely depressed results.      Alkaline Phosphatase 62  34 - 104 U/L Final    Total Protein 7.4  6.4 - 8.4 g/dL Final    Albumin 4.2  3.5 - 5.0 g/dL Final    Total Bilirubin 0.43  0.20 - 1.00 mg/dL Final    Comment: Use of this assay is not recommended for patients undergoing treatment with eltrombopag due to the potential for falsely elevated results.  N-acetyl-p-benzoquinone imine (metabolite of Acetaminophen) will generate erroneously low results in samples for patients that have taken an overdose of Acetaminophen.    eGFR 80  ml/min/1.73sq m Final    Narrative:     National Kidney Disease Foundation guidelines for Chronic Kidney Disease (CKD):     Stage 1 with normal or high GFR (GFR > 90 mL/min/1.73 square meters)    Stage 2 Mild CKD (GFR = 60-89 mL/min/1.73 square meters)    Stage 3A Moderate CKD (GFR = 45-59 mL/min/1.73 square meters)    Stage 3B Moderate CKD (GFR = 30-44 mL/min/1.73 square meters)    Stage 4 Severe CKD (GFR = 15-29 mL/min/1.73 square meters)    Stage 5 End Stage CKD (GFR <15 mL/min/1.73 square meters)  Note: GFR calculation is accurate only with a steady state creatinine   HIGH SENSITIVITY TROPONIN I RANDOM - Abnormal    HS TnI random <2 (*) 8 - 18 ng/L Final    Comment:                                              Initial (time 0) result  If >=50 ng/L, Myocardial injury suggested ;  Type of myocardial injury and treatment strategy  to be determined.  If 5-49 ng/L, a delta result at 2 hours and or 4 hours will be needed to further evaluate.  If <4 ng/L, and chest pain has been >3 hours since onset, patient may qualify for discharge based on the HEART score in the ED.  If <5 ng/L and <3hours since onset of chest pain, a delta result at 2 hours will be needed to further evaluate.    HS Troponin 99th Percentile URL of a Health Population=12 ng/L with a 95% Confidence Interval of 8-18 ng/L.    Second Troponin (time 2 hours)  If calculated delta >= 20 ng/L,  Myocardial injury suggested ; Type of myocardial injury and  treatment strategy to be determined.  If 5-49 ng/L and the calculated delta is 5-19 ng/L, consult medical service for evaluation.  Continue evaluation for ischemia on ecg and other possible etiology and repeat hs troponin at 4 hours.  If delta is <5 ng/L at 2 hours, consider discharge based on risk stratification via the HEART score (if in ED), or CHELA risk score in IP/Observation.    HS Troponin 99th Percentile URL of a Health Population=12 ng/L with a 95% Confidence Interval of 8-18 ng/L.

## 2024-01-19 LAB
ATRIAL RATE: 74 BPM
P AXIS: 49 DEGREES
PR INTERVAL: 134 MS
QRS AXIS: 78 DEGREES
QRSD INTERVAL: 68 MS
QT INTERVAL: 354 MS
QTC INTERVAL: 392 MS
T WAVE AXIS: 45 DEGREES
VENTRICULAR RATE: 74 BPM

## 2025-01-16 ENCOUNTER — HOSPITAL ENCOUNTER (EMERGENCY)
Facility: HOSPITAL | Age: 40
Discharge: HOME | End: 2025-01-16
Attending: EMERGENCY MEDICINE
Payer: COMMERCIAL

## 2025-01-16 VITALS
SYSTOLIC BLOOD PRESSURE: 128 MMHG | TEMPERATURE: 98.1 F | OXYGEN SATURATION: 100 % | DIASTOLIC BLOOD PRESSURE: 83 MMHG | RESPIRATION RATE: 18 BRPM | HEART RATE: 84 BPM

## 2025-01-16 DIAGNOSIS — J32.9 SINUSITIS: Primary | ICD-10-CM

## 2025-01-16 PROCEDURE — 99284 EMERGENCY DEPT VISIT MOD MDM: CPT | Performed by: EMERGENCY MEDICINE

## 2025-01-16 PROCEDURE — 99283 EMERGENCY DEPT VISIT LOW MDM: CPT

## 2025-01-16 RX ORDER — GUAIFENESIN 600 MG/1
600 TABLET, EXTENDED RELEASE ORAL EVERY 12 HOURS SCHEDULED
Qty: 14 TABLET | Refills: 0 | Status: SHIPPED | OUTPATIENT
Start: 2025-01-16 | End: 2025-01-23

## 2025-01-16 RX ORDER — GUAIFENESIN 600 MG/1
600 TABLET, EXTENDED RELEASE ORAL ONCE
Status: COMPLETED | OUTPATIENT
Start: 2025-01-16 | End: 2025-01-16

## 2025-01-16 RX ORDER — DOXYCYCLINE 100 MG/1
100 CAPSULE ORAL 2 TIMES DAILY
Qty: 14 CAPSULE | Refills: 0 | Status: SHIPPED | OUTPATIENT
Start: 2025-01-16 | End: 2025-01-23

## 2025-01-16 RX ORDER — DOXYCYCLINE 100 MG/1
100 CAPSULE ORAL ONCE
Status: COMPLETED | OUTPATIENT
Start: 2025-01-16 | End: 2025-01-16

## 2025-01-16 RX ORDER — FLUTICASONE PROPIONATE 50 MCG
1 SPRAY, SUSPENSION (ML) NASAL ONCE
Status: COMPLETED | OUTPATIENT
Start: 2025-01-16 | End: 2025-01-16

## 2025-01-16 RX ADMIN — FLUTICASONE PROPIONATE 1 SPRAY: 50 SPRAY, METERED NASAL at 16:22

## 2025-01-16 RX ADMIN — DOXYCYCLINE HYCLATE 100 MG: 100 CAPSULE ORAL at 16:22

## 2025-01-16 RX ADMIN — GUAIFENESIN 600 MG: 600 TABLET ORAL at 16:22

## 2025-01-16 NOTE — ED PROVIDER NOTES
Time reflects when diagnosis was documented in both MDM as applicable and the Disposition within this note       Time User Action Codes Description Comment    1/16/2025  4:13 PM Mary Jane Davidson Add [J32.9] Sinusitis           ED Disposition       ED Disposition   Discharge    Condition   Stable    Date/Time   Thu Jan 16, 2025  4:13 PM    Comment   Mariel PARSON Awais discharge to home/self care.                   Assessment & Plan       Medical Decision Making  Differential diagnosis includes but is not limited to acute bacterial sinusitis, acute viral but sinusitis, seasonal allergies, viral illness    Problems Addressed:  Sinusitis: acute illness or injury    Risk  OTC drugs.  Prescription drug management.             Medications   fluticasone (FLONASE) 50 mcg/act nasal spray 1 spray (has no administration in time range)   guaiFENesin (MUCINEX) 12 hr tablet 600 mg (has no administration in time range)   doxycycline hyclate (VIBRAMYCIN) capsule 100 mg (has no administration in time range)       ED Risk Strat Scores                                              History of Present Illness       Chief Complaint   Patient presents with    Nasal Congestion     Patient reports nasal congestion for about 2 weeks and now can't hear out of her L ear        Past Medical History:   Diagnosis Date    Anxiety     Asthma     Depression     Hyperlipidemia     Hypertension     Migraine       Past Surgical History:   Procedure Laterality Date    CAST APPLICATION Left 12/15/2020    Procedure: Application short-arm splint left arm;  Surgeon: Quang Perez MD;  Location: [facility];  Service: Orthopedics    WA OPTX RADIAL&ULNAR SHFT FX W/INT FIXJ RADIUS&ULNA Left 12/15/2020    Procedure: Open reduction and internal fixation left-sided 3+ part distal radius [id number];  Surgeon: Quang Perez MD;  Location: [facility];  Service: Orthopedics    TUBAL LIGATION      WRIST HARDWARE REMOVAL Left 3/23/2021    Procedure:  REMOVAL HARDWARE left wrist spanning plate;  Surgeon: Quang Perez MD;  Location: [facility];  Service: Orthopedics      History reviewed. No pertinent family history.   Social History     Tobacco Use    Smoking status: Every Day     Current packs/day: 0.50     Types: Cigarettes    Smokeless tobacco: Never   Vaping Use    Vaping status: Never Used   Substance Use Topics    Alcohol use: Not Currently    Drug use: Never      E-Cigarette/Vaping    E-Cigarette Use Never User       E-Cigarette/Vaping Substances    Nicotine No     THC No     CBD No     Flavoring No     Other No     Unknown No       I have reviewed and agree with the history as documented.     39-year-old female comes in for nasal congestion.  Patient states for approximately 2 weeks she has worsening nasal congestion and now for 1 week sinus pressure postnasal drip and over the last 24 hours left ear pain.  No fevers.  Tried someone else's antibiotics without relief.  Does not know what antibiotics they were.  Did not help her symptoms.  Only took a few days worth.      History provided by:  Patient   used: No    Sinus Problem  Pain details:     Location:  Frontal and maxillary    Quality:  Pressure and aching    Severity:  Severe    Duration:  1 week    Timing:  Constant  Progression:  Worsening  Chronicity:  New  Context: recent URI    Ineffective treatments: Friends antibiotics no OTC medication.  Associated symptoms: congestion and ear pain    Associated symptoms: no chest pain, no chills, no cough, no fever, no shortness of breath, no sore throat and no vomiting    Risk factors: no allergic reaction, no diabetes and no smoke exposure        Review of Systems   Constitutional:  Negative for chills and fever.   HENT:  Positive for congestion and ear pain. Negative for sore throat.    Eyes:  Negative for pain and visual disturbance.   Respiratory:  Negative for cough and shortness of breath.    Cardiovascular:  Negative for  chest pain and palpitations.   Gastrointestinal:  Negative for abdominal pain and vomiting.   Genitourinary:  Negative for dysuria and hematuria.   Musculoskeletal:  Negative for arthralgias and back pain.   Skin:  Negative for color change and rash.   Neurological:  Negative for seizures and syncope.   All other systems reviewed and are negative.          Objective       ED Triage Vitals [01/16/25 1600]   Temperature Pulse Blood Pressure Respirations SpO2 Patient Position - Orthostatic VS   98.1 °F (36.7 °C) 84 128/83 18 100 % Sitting      Temp Source Heart Rate Source BP Location FiO2 (%) Pain Score    Oral Monitor Right arm -- --      Vitals      Date and Time Temp Pulse SpO2 Resp BP Pain Score FACES Pain Rating User   01/16/25 1600 98.1 °F (36.7 °C) 84 100 % 18 128/83 -- -- PE            Physical Exam  HENT:      Left Ear: A middle ear effusion is present.      Nose: Mucosal edema and congestion present.      Right Sinus: Maxillary sinus tenderness present.      Left Sinus: Maxillary sinus tenderness present.         Results Reviewed       None            No orders to display       Procedures    ED Medication and Procedure Management   Prior to Admission Medications   Prescriptions Last Dose Informant Patient Reported? Taking?   SUMAtriptan (IMITREX) 50 mg tablet   Yes No   Sig: Take 50 mg by mouth 2 (two) times a day as needed   acetaminophen (TYLENOL) 650 mg CR tablet   No No   Sig: Take 1 tablet (650 mg total) by mouth every 8 (eight) hours as needed for mild pain   aspirin-acetaminophen-caffeine (EXCEDRIN MIGRAINE) 250-250-65 MG per tablet   Yes No   Sig: Take 1 tablet by mouth every 6 (six) hours as needed for headaches   escitalopram (LEXAPRO) 10 mg tablet   Yes No   Sig: Take 10 mg by mouth as needed Takes at bedtime   meloxicam (MOBIC) 15 mg tablet   No No   Sig: Take 1 tablet (15 mg total) by mouth daily   naproxen sodium (ALEVE) 220 MG tablet   No No   Sig: Take 1 tablet (220 mg total) by mouth 2 (two)  times a day with meals   topiramate (TOPAMAX) 50 MG tablet   Yes No   Sig: Take 50 mg by mouth as needed       Facility-Administered Medications: None     Current Discharge Medication List        START taking these medications    Details   doxycycline hyclate (VIBRAMYCIN) 100 mg capsule Take 1 capsule (100 mg total) by mouth 2 (two) times a day for 7 days  Qty: 14 capsule, Refills: 0    Associated Diagnoses: Sinusitis      guaiFENesin (MUCINEX) 600 mg 12 hr tablet Take 1 tablet (600 mg total) by mouth every 12 (twelve) hours for 7 days  Qty: 14 tablet, Refills: 0    Associated Diagnoses: Sinusitis           CONTINUE these medications which have NOT CHANGED    Details   acetaminophen (TYLENOL) 650 mg CR tablet Take 1 tablet (650 mg total) by mouth every 8 (eight) hours as needed for mild pain  Qty: 30 tablet, Refills: 0    Associated Diagnoses: Other closed fracture of distal end of left radius with routine healing, subsequent encounter      aspirin-acetaminophen-caffeine (EXCEDRIN MIGRAINE) 250-250-65 MG per tablet Take 1 tablet by mouth every 6 (six) hours as needed for headaches      escitalopram (LEXAPRO) 10 mg tablet Take 10 mg by mouth as needed Takes at bedtime      meloxicam (MOBIC) 15 mg tablet Take 1 tablet (15 mg total) by mouth daily  Qty: 30 tablet, Refills: 0    Associated Diagnoses: Aftercare following surgery of the musculoskeletal system; S/P ORIF (open reduction internal fixation) fracture      naproxen sodium (ALEVE) 220 MG tablet Take 1 tablet (220 mg total) by mouth 2 (two) times a day with meals  Qty: 20 tablet, Refills: 0    Associated Diagnoses: Other closed fracture of distal end of left radius with routine healing, subsequent encounter      SUMAtriptan (IMITREX) 50 mg tablet Take 50 mg by mouth 2 (two) times a day as needed      topiramate (TOPAMAX) 50 MG tablet Take 50 mg by mouth as needed            No discharge procedures on file.  ED SEPSIS DOCUMENTATION   Time reflects when diagnosis  was documented in both MDM as applicable and the Disposition within this note       Time User Action Codes Description Comment    1/16/2025  4:13 PM Mary Jane Davidson Add [J32.9] Sinusitis                  Mary Jane Davidson,   01/16/25 1617

## 2025-05-01 ENCOUNTER — HOSPITAL ENCOUNTER (EMERGENCY)
Facility: HOSPITAL | Age: 40
Discharge: HOME | End: 2025-05-01
Attending: EMERGENCY MEDICINE
Payer: COMMERCIAL

## 2025-05-01 VITALS
SYSTOLIC BLOOD PRESSURE: 132 MMHG | TEMPERATURE: 98.4 F | OXYGEN SATURATION: 100 % | HEART RATE: 89 BPM | RESPIRATION RATE: 18 BRPM | DIASTOLIC BLOOD PRESSURE: 97 MMHG

## 2025-05-01 DIAGNOSIS — T24.201A PARTIAL THICKNESS BURN OF RIGHT LOWER EXTREMITY, INITIAL ENCOUNTER: Primary | ICD-10-CM

## 2025-05-01 PROCEDURE — 90715 TDAP VACCINE 7 YRS/> IM: CPT | Performed by: EMERGENCY MEDICINE

## 2025-05-01 PROCEDURE — 90471 IMMUNIZATION ADMIN: CPT

## 2025-05-01 PROCEDURE — 99284 EMERGENCY DEPT VISIT MOD MDM: CPT | Performed by: EMERGENCY MEDICINE

## 2025-05-01 PROCEDURE — 99283 EMERGENCY DEPT VISIT LOW MDM: CPT

## 2025-05-01 RX ORDER — CLINDAMYCIN HYDROCHLORIDE 150 MG/1
300 CAPSULE ORAL ONCE
Status: COMPLETED | OUTPATIENT
Start: 2025-05-01 | End: 2025-05-01

## 2025-05-01 RX ORDER — CLINDAMYCIN HYDROCHLORIDE 300 MG/1
300 CAPSULE ORAL 4 TIMES DAILY
Qty: 28 CAPSULE | Refills: 0 | Status: SHIPPED | OUTPATIENT
Start: 2025-05-01 | End: 2025-05-08

## 2025-05-01 RX ORDER — GINSENG 100 MG
1 CAPSULE ORAL ONCE
Status: COMPLETED | OUTPATIENT
Start: 2025-05-01 | End: 2025-05-01

## 2025-05-01 RX ORDER — GINSENG 100 MG
1 CAPSULE ORAL 2 TIMES DAILY
Qty: 28 G | Refills: 0 | Status: SHIPPED | OUTPATIENT
Start: 2025-05-01

## 2025-05-01 RX ADMIN — CLINDAMYCIN HYDROCHLORIDE 300 MG: 150 CAPSULE ORAL at 19:04

## 2025-05-01 RX ADMIN — TETANUS TOXOID, REDUCED DIPHTHERIA TOXOID AND ACELLULAR PERTUSSIS VACCINE, ADSORBED 0.5 ML: 5; 2.5; 8; 8; 2.5 SUSPENSION INTRAMUSCULAR at 19:04

## 2025-05-01 RX ADMIN — BACITRACIN 1 LARGE APPLICATION: 500 OINTMENT TOPICAL at 19:04

## 2025-05-02 NOTE — ED PROVIDER NOTES
Time reflects when diagnosis was documented in both MDM as applicable and the Disposition within this note       Time User Action Codes Description Comment    5/1/2025  6:56 PM Ashia Ramírez Add [T24.201A] Partial thickness burn of right lower extremity, initial encounter           ED Disposition       ED Disposition   Discharge    Condition   Stable    Date/Time   Thu May 1, 2025  6:56 PM    Comment   Mariel Ernandez discharge to home/self care.                   Assessment & Plan       Medical Decision Making  Healthy 39-year-old female presents approximately 1 week after spilling hot oil on her right leg and foot.  She reports that initially areas on her leg and foot blistered she has been trying to keep some covered and clean.  She is having increasing pain and feels like they should have healed better by now.  Also noting increasing redness around the foot wound.  No fevers or chills.  She is otherwise feeling well.  She has not taken anything for pain today.  On arrival patient has normal vitals and is well-appearing.  Burn consistent with liquid splatter on her lateral calf and dorsal right foot with quarter size areas of open moist burn consistent with partial-thickness.  Some surrounding erythema and tenderness adjacent to the burn on the dorsum of the foot concerning for cellulitis given increasing pain of the foot.  Will apply bacitracin.  Provided patient with more bacitracin and will also prescribe clindamycin given her history of severe penicillin allergy.  Wound dressings provided.  Patient counseled on appropriate wound care.  Will follow-up with burn clinic as needed if symptoms do not improve over the next few days.    Risk  OTC drugs.  Prescription drug management.             Medications   bacitracin topical ointment 1 large application (1 large application Topical Given 5/1/25 1904)   tetanus-diphtheria-acellular pertussis (BOOSTRIX) IM injection 0.5 mL (0.5 mL Intramuscular Given 5/1/25 1904)    clindamycin (CLEOCIN) capsule 300 mg (300 mg Oral Given 5/1/25 1904)       ED Risk Strat Scores                    No data recorded        SBIRT 20yo+      Flowsheet Row Most Recent Value   Initial Alcohol Screen: US AUDIT-C     1. How often do you have a drink containing alcohol? 0 Filed at: 05/01/2025 1909   2. How many drinks containing alcohol do you have on a typical day you are drinking?  0 Filed at: 05/01/2025 1909   3a. Male UNDER 65: How often do you have five or more drinks on one occasion? 0 Filed at: 05/01/2025 1909   3b. FEMALE Any Age, or MALE 65+: How often do you have 4 or more drinks on one occassion? 0 Filed at: 05/01/2025 1909   Audit-C Score 0 Filed at: 05/01/2025 1909   NALDO: How many times in the past year have you...    Used an illegal drug or used a prescription medication for non-medical reasons? Never Filed at: 05/01/2025 1909                            History of Present Illness       Chief Complaint   Patient presents with    Burn     Pt reports spilling hot oil on right leg and top of right foot.        Past Medical History:   Diagnosis Date    Anxiety     Asthma     Depression     Hyperlipidemia     Hypertension     Migraine       Past Surgical History:   Procedure Laterality Date    CAST APPLICATION Left 12/15/2020    Procedure: Application short-arm splint left arm;  Surgeon: Quang Perez MD;  [facility]: [facility];  Service: Orthopedics    [place] OPTX RADIAL&ULNAR SHFT FX W/INT FIXJ RADIUS&ULNA Left 12/15/2020    Procedure: Open reduction and internal fixation left-sided 3+ part distal radius [id number];  Surgeon: Quang Perez MD;  Location: [facility];  Service: Orthopedics    TUBAL LIGATION      WRIST HARDWARE REMOVAL Left 3/23/2021    Procedure: REMOVAL HARDWARE left wrist spanning plate;  Surgeon: Quang Perez MD;  Location: [facility];  Service: Orthopedics      History reviewed. No pertinent family history.   Social History     Tobacco Use     Smoking status: Every Day     Current packs/day: 0.50     Types: Cigarettes    Smokeless tobacco: Never   Vaping Use    Vaping status: Never Used   Substance Use Topics    Alcohol use: Not Currently    Drug use: Never      E-Cigarette/Vaping    E-Cigarette Use Never User       E-Cigarette/Vaping Substances    Nicotine No     THC No     CBD No     Flavoring No     Other No     Unknown No       I have reviewed and agree with the history as documented.     Healthy 39-year-old female presents approximately 1 week after spilling hot oil on her right leg and foot.  She reports that initially areas on her leg and foot blistered she has been trying to keep some covered and clean.  She is having increasing pain and feels like they should have healed better by now.  Also noting increasing redness around the foot wound.  No fevers or chills.  She is otherwise feeling well.  She has not taken anything for pain today.        Review of Systems   All other systems reviewed and are negative.          Objective       ED Triage Vitals [05/01/25 1835]   Temperature Pulse Blood Pressure Respirations SpO2 Patient Position - Orthostatic VS   98.4 °F (36.9 °C) 89 132/97 18 100 % Sitting      Temp Source Heart Rate Source BP Location FiO2 (%) Pain Score    Oral Monitor Left arm -- --      Vitals      Date and Time Temp Pulse SpO2 Resp BP Pain Score FACES Pain Rating User   05/01/25 1835 98.4 °F (36.9 °C) 89 100 % 18 132/97 -- -- RN            Physical Exam  Constitutional:       General: She is not in acute distress.  HENT:      Mouth/Throat:      Mouth: Mucous membranes are moist.   Cardiovascular:      Rate and Rhythm: Normal rate.   Pulmonary:      Effort: Pulmonary effort is normal.   Musculoskeletal:         General: Normal range of motion.   Skin:     General: Skin is warm and dry.      Comments: burn consistent with liquid splatter on her lateral calf and dorsal right foot with quarter size areas of open moist burn consistent with  partial-thickness.  Some surrounding erythema and tenderness adjacent to the burn on the dorsum of the foot   Neurological:      General: No focal deficit present.      Mental Status: She is alert and oriented to person, place, and time.   Psychiatric:         Mood and Affect: Mood normal.         Behavior: Behavior normal.         Results Reviewed       None            No orders to display       Procedures    ED Medication and Procedure Management   Prior to Admission Medications   Prescriptions Last Dose Informant Patient Reported? Taking?   SUMAtriptan (IMITREX) 50 mg tablet   Yes No   Sig: Take 50 mg by mouth 2 (two) times a day as needed   acetaminophen (TYLENOL) 650 mg CR tablet   No No   Sig: Take 1 tablet (650 mg total) by mouth every 8 (eight) hours as needed for mild pain   aspirin-acetaminophen-caffeine (EXCEDRIN MIGRAINE) 250-250-65 MG per tablet   Yes No   Sig: Take 1 tablet by mouth every 6 (six) hours as needed for headaches   escitalopram (LEXAPRO) 10 mg tablet   Yes No   Sig: Take 10 mg by mouth as needed Takes at bedtime   meloxicam (MOBIC) 15 mg tablet   No No   Sig: Take 1 tablet (15 mg total) by mouth daily   naproxen sodium (ALEVE) 220 MG tablet   No No   Sig: Take 1 tablet (220 mg total) by mouth 2 (two) times a day with meals   topiramate (TOPAMAX) 50 MG tablet   Yes No   Sig: Take 50 mg by mouth as needed       Facility-Administered Medications: None     Discharge Medication List as of 5/1/2025  7:15 PM        START taking these medications    Details   bacitracin topical ointment 500 units/g topical ointment Apply 1 large application topically 2 (two) times a day, Starting u 5/1/2025, Normal      clindamycin (CLEOCIN) 300 MG capsule Take 1 capsule (300 mg total) by mouth 4 (four) times a day for 7 days, Starting u 5/1/2025, Until u 5/8/2025, Normal           CONTINUE these medications which have NOT CHANGED    Details   acetaminophen (TYLENOL) 650 mg CR tablet Take 1 tablet (650 mg  total) by mouth every 8 (eight) hours as needed for mild pain, Starting Tue 3/23/2021, Normal      aspirin-acetaminophen-caffeine (EXCEDRIN MIGRAINE) 250-250-65 MG per tablet Take 1 tablet by mouth every 6 (six) hours as needed for headaches, Historical Med      escitalopram (LEXAPRO) 10 mg tablet Take 10 mg by mouth as needed Takes at bedtime, Starting Tue 7/28/2020, Historical Med      meloxicam (MOBIC) 15 mg tablet Take 1 tablet (15 mg total) by mouth daily, Starting Wed 5/19/2021, Normal      naproxen sodium (ALEVE) 220 MG tablet Take 1 tablet (220 mg total) by mouth 2 (two) times a day with meals, Starting Tue 3/23/2021, Normal      SUMAtriptan (IMITREX) 50 mg tablet Take 50 mg by mouth 2 (two) times a day as needed, Starting Tue 7/28/2020, Until Wed 7/28/2021, Historical Med      topiramate (TOPAMAX) 50 MG tablet Take 50 mg by mouth as needed , Starting Tue 6/16/2020, Until Wed 6/16/2021, Historical Med           No discharge procedures on file.  ED SEPSIS DOCUMENTATION   Time reflects when diagnosis was documented in both MDM as applicable and the Disposition within this note       Time User Action Codes Description Comment    5/1/2025  6:56 PM Ashia Ramírez Add [T24.201A] Partial thickness burn of right lower extremity, initial encounter                  Ashia Ramírez MD  05/02/25 1124

## (undated) DEVICE — ARM SLING: Brand: DEROYAL

## (undated) DEVICE — DRAPE C-ARM X-RAY

## (undated) DEVICE — GLOVE SRG BIOGEL 7.5

## (undated) DEVICE — ACE WRAP 4 IN STERILE

## (undated) DEVICE — STERILE BETHLEHEM PLASTIC HAND: Brand: CARDINAL HEALTH

## (undated) DEVICE — INTENDED FOR TISSUE SEPARATION, AND OTHER PROCEDURES THAT REQUIRE A SHARP SURGICAL BLADE TO PUNCTURE OR CUT.: Brand: BARD-PARKER SAFETY BLADES SIZE 15, STERILE

## (undated) DEVICE — OCCLUSIVE GAUZE STRIP,3% BISMUTH TRIBROMOPHENATE IN PETROLATUM BLEND: Brand: XEROFORM

## (undated) DEVICE — TUBING SUCTION 5MM X 12 FT

## (undated) DEVICE — DISPOSABLE EQUIPMENT COVER: Brand: SMALL TOWEL DRAPE

## (undated) DEVICE — CHLORAPREP HI-LITE 26ML ORANGE

## (undated) DEVICE — DRILL TWIST 2.0 X 95MM

## (undated) DEVICE — GLOVE INDICATOR PI UNDERGLOVE SZ 8 BLUE

## (undated) DEVICE — PADDING CAST 4 IN  COTTON STRL

## (undated) DEVICE — DRAPE EQUIPMENT RF WAND

## (undated) DEVICE — SUT VICRYL PLUS 1 CTB-1 36 IN VCPB947H

## (undated) DEVICE — SPONGE PVP SCRUB WING STERILE

## (undated) DEVICE — SUT VICRYL PLUS 2-0 CTB-1 27 IN VCPB259H

## (undated) DEVICE — DRESSING GUAZE ADH BORDER 4 X 4 IN

## (undated) DEVICE — CUFF TOURNIQUET 18 X 4 IN QUICK CONNECT DISP 1 BLADDER

## (undated) DEVICE — ACE WRAP 3 IN STERILE

## (undated) DEVICE — SUT PROLENE 4-0 PS-2 18 IN 8682G

## (undated) DEVICE — GAUZE SPONGES,16 PLY: Brand: CURITY